# Patient Record
Sex: MALE | Race: WHITE | Employment: FULL TIME | URBAN - METROPOLITAN AREA
[De-identification: names, ages, dates, MRNs, and addresses within clinical notes are randomized per-mention and may not be internally consistent; named-entity substitution may affect disease eponyms.]

---

## 2017-02-02 ENCOUNTER — OFFICE VISIT (OUTPATIENT)
Dept: FAMILY MEDICINE | Facility: CLINIC | Age: 37
End: 2017-02-02
Payer: COMMERCIAL

## 2017-02-02 VITALS
HEART RATE: 71 BPM | OXYGEN SATURATION: 99 % | BODY MASS INDEX: 24.91 KG/M2 | TEMPERATURE: 97 F | SYSTOLIC BLOOD PRESSURE: 131 MMHG | HEIGHT: 70 IN | WEIGHT: 174 LBS | DIASTOLIC BLOOD PRESSURE: 75 MMHG

## 2017-02-02 DIAGNOSIS — M54.2 NECK PAIN: Primary | ICD-10-CM

## 2017-02-02 PROCEDURE — 99213 OFFICE O/P EST LOW 20 MIN: CPT | Performed by: PHYSICIAN ASSISTANT

## 2017-02-02 NOTE — PROGRESS NOTES
"SUBJECTIVE:   Toribio Miller is a 36 year old male seen here today for his right Shoulder   What happened: started bothering him no known injury ,started 3 days ago      Onset: 3 days ago     Description:   Character: Sharp, Stabbing and Gnawing    Intensity: moderate, 6/10    Progression of Symptoms: worse in am ,took 4 tylenol  doesn't feel as bad,doesnt know strength of tylenol    Accompanying Signs & Symptoms:  Other symptoms: weakness of arm cant get out of bed   History:   Previous similar pain: no       Precipitating factors:   Trauma or overuse: no     Alleviating factors:  Improved by: rest/inactivity, immobilization, massage, stretching and acetaminophen       Therapies Tried and outcome: tylenol.   Is RHD, works construction.   Reproducible pain with range of motion on neck and with deep breath. No injury.   Pain with sleeping.     ROS:  Negative for constitutional, eye, ear, nose, throat, skin, respiratory, cardiac, and gastrointestinal other than those outlined in the HPI.    PFSH:  Past Medical, social, family histories, medications, and allergies were reviewed and updated.     OBJECTIVE:  /75 mmHg  Pulse 71  Temp(Src) 97  F (36.1  C) (Oral)  Ht 5' 10\" (1.778 m)  Wt 174 lb (78.926 kg)  BMI 24.97 kg/m2  SpO2 99%  General:  Toribio is awake, alert, and cooperative.  NAD.  Cervical Spine Exam: Inspection: normal cervical lordosis  Tender:  right paracervical muscles, right trapezius muscles, right rhomboid region  Non-tender:  spinous processes  Range of Motion:  Full ROM of cervical spine- pain with flexion and eimli side bending  Strength: 5/5 emili upper extremities   Special tests:  Neg spurlings.  full range of motion emili shoulder. Neurovascularly Intact Distally.     ASSESSMENT:     ICD-10-CM    1. Neck pain M54.2        PLAN:   ice or cold packs 20 minutes every 2-3 hrs as needed to relieve pain and swelling, for the first 2 days. Then can apply heat 20 minutes every 2-3 hrs (avoid sleeping " on heating pad) there after as needed.   If able to tolerate non-steroidal anti-inflammatory medication like: Ibuprofen 600-800 mg three times daily or Aleve 200-400 mg twice daily.   Active range of motion exercises encouraged  Activity modification trying to avoid activities that cause you pain.   Follow up 4 wks as needed   PHYSICAL THERAPY    Alfie Reyez PA-C

## 2017-02-02 NOTE — MR AVS SNAPSHOT
"              After Visit Summary   2/2/2017    Toribio Miller    MRN: [id number]           Patient Information     Date Of Birth          1980        Visit Information        Provider Department      2/2/2017 3:30 PM Alfie Reyez PA-C [facility]        Today's Diagnoses     Neck pain    -  1       Care Instructions    ice or cold packs 20 minutes every 2-3 hrs as needed to relieve pain and swelling, for the first 2 days. Then can apply heat 20 minutes every 2-3 hrs (avoid sleeping on heating pad) there after as needed.   If able to tolerate non-steroidal anti-inflammatory medication like: Ibuprofen 600-800 mg three times daily or Aleve 200-400 mg twice daily.   Active range of motion exercises encouraged  Activity modification trying to avoid activities that cause you pain.   Follow up 4 wks as needed    PHYSICAL THERAPY [phone]       Alfie Reyez PA-C            Follow-ups after your visit        Who to contact     If you have questions or need follow up information about today's clinic visit or your schedule please contact [facility] directly at [phone].  Normal or non-critical lab and imaging results will be communicated to you by [email or web address], letter or phone within 4 business days after the clinic has received the results. If you do not hear from us within 7 days, please contact the clinic through Spotstert or phone. If you have a critical or abnormal lab result, we will notify you by phone as soon as possible.  Submit refill requests through [employer] or call your pharmacy and they will forward the refill request to us. Please allow 3 business days for your refill to be completed.          Additional Information About Your Visit        MyChart Information     [employer] lets you send messages to your doctor, view your test results, renew your prescriptions, schedule appointments and more. To sign up, go to [email or web address] . Click on \"Log in\" on the left side of " "the screen, which will take you to the Welcome page. Then click on \"Sign up Now\" on the right side of the page.     You will be asked to enter the access code listed below, as well as some personal information. Please follow the directions to create your username and password.     Your access code is: [email or web address]  Expires: 5/3/2017  3:51 PM     Your access code will  in 90 days. If you need help or a new code, please call your [place] clinic or [phone].        Care EveryWhere ID     This is your Care EveryWhere ID. This could be used by other organizations to access your [place] medical records  [id number]        Your Vitals Were     Pulse Temperature Height BMI (Body Mass Index) Pulse Oximetry       71 97  F (36.1  C) (Oral) 5' 10\" (1.778 m) 24.97 kg/m2 99%        Blood Pressure from Last 3 Encounters:   17 131/75   01/13/15 149/80    Weight from Last 3 Encounters:   17 174 lb (78.926 kg)   01/13/15 174 lb (78.926 kg)              Today, you had the following     No orders found for display       Primary Care Provider Office Phone # Fax #    [facility] [phone] [address] [address] [facility] [address]        Thank you!     Thank you for choosing [facility]  for your care. Our goal is always to provide you with excellent care. Hearing back from our patients is one way we can continue to improve our services. Please take a few minutes to complete the written survey that you may receive in the mail after your visit with us. Thank you!             Your Updated Medication List - Protect others around you: Learn how to safely use, store and throw away your medicines at www.disposemymeds.org.      Notice  As of 2017  3:51 PM    You have not been prescribed any medications.      "

## 2017-02-02 NOTE — NURSING NOTE
"Chief Complaint   Patient presents with     Shoulder Pain       Initial /75 mmHg  Pulse 71  Temp(Src) 97  F (36.1  C) (Oral)  Ht 5' 10\" (1.778 m)  Wt 174 lb (78.926 kg)  BMI 24.97 kg/m2  SpO2 99% Estimated body mass index is 24.97 kg/(m^2) as calculated from the following:    Height as of this encounter: 5' 10\" (1.778 m).    Weight as of this encounter: 174 lb (78.926 kg).  BP completed using cuff size: hazel Cross CMA   3:29 PM  2/2/2017      "

## 2017-02-02 NOTE — PATIENT INSTRUCTIONS
ice or cold packs 20 minutes every 2-3 hrs as needed to relieve pain and swelling, for the first 2 days. Then can apply heat 20 minutes every 2-3 hrs (avoid sleeping on heating pad) there after as needed.   If able to tolerate non-steroidal anti-inflammatory medication like: Ibuprofen 600-800 mg three times daily or Aleve 200-400 mg twice daily.   Active range of motion exercises encouraged  Activity modification trying to avoid activities that cause you pain.   Follow up 4 wks as needed    PHYSICAL THERAPY [phone]       Alfie Reyez PA-C

## 2017-11-07 ENCOUNTER — OFFICE VISIT (OUTPATIENT)
Dept: FAMILY MEDICINE | Facility: CLINIC | Age: 37
End: 2017-11-07
Payer: COMMERCIAL

## 2017-11-07 ENCOUNTER — RADIANT APPOINTMENT (OUTPATIENT)
Dept: GENERAL RADIOLOGY | Facility: CLINIC | Age: 37
End: 2017-11-07
Attending: FAMILY MEDICINE
Payer: COMMERCIAL

## 2017-11-07 VITALS
HEART RATE: 71 BPM | SYSTOLIC BLOOD PRESSURE: 133 MMHG | OXYGEN SATURATION: 97 % | DIASTOLIC BLOOD PRESSURE: 81 MMHG | TEMPERATURE: 98.1 F | BODY MASS INDEX: 24.68 KG/M2 | WEIGHT: 172 LBS

## 2017-11-07 DIAGNOSIS — R10.9 FLANK PAIN: ICD-10-CM

## 2017-11-07 DIAGNOSIS — R10.9 FLANK PAIN: Primary | ICD-10-CM

## 2017-11-07 PROCEDURE — 72100 X-RAY EXAM L-S SPINE 2/3 VWS: CPT

## 2017-11-07 PROCEDURE — 99214 OFFICE O/P EST MOD 30 MIN: CPT | Performed by: FAMILY MEDICINE

## 2017-11-07 RX ORDER — NAPROXEN 500 MG/1
500 TABLET ORAL 2 TIMES DAILY PRN
Qty: 60 TABLET | Refills: 0 | Status: SHIPPED | OUTPATIENT
Start: 2017-11-07 | End: 2018-10-26

## 2017-11-07 RX ORDER — CYCLOBENZAPRINE HCL 5 MG
5 TABLET ORAL
Qty: 30 TABLET | Refills: 1 | Status: SHIPPED | OUTPATIENT
Start: 2017-11-07 | End: 2018-10-26

## 2017-11-07 NOTE — NURSING NOTE
"Chief Complaint   Patient presents with     Musculoskeletal Problem       Initial /81  Pulse 71  Temp 98.1  F (36.7  C) (Oral)  Wt 172 lb (78 kg)  SpO2 97%  BMI 24.68 kg/m2 Estimated body mass index is 24.68 kg/(m^2) as calculated from the following:    Height as of 2/2/17: 5' 10\" (1.778 m).    Weight as of this encounter: 172 lb (78 kg).  Medication Reconciliation: complete  Roula Browne CMA    "

## 2017-11-07 NOTE — PROGRESS NOTES
SUBJECTIVE:  Toribio Miller, a 37 year old male scheduled an appointment to discuss the following issues:  Back spasms since Sunday/ they come and go.  Seen for back pain 2 years ago and recommended p.t. And naprosyn prescription given.   Was shooting down leg in past.  Now lower back. No radiations in leg/buttock. No weakness. No bm/bladder changes. No p.t./chiro. No family history back issues.   No over the counter meds. Heat helpful.   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   No history kidney stones. No blood in urine or dysuria. No fevers or chills.   No history ulcers.   No past medical history on file.    Past Surgical History:   Procedure Laterality Date     ORTHOPEDIC SURGERY  10yrs ago per patient    left ankle        No family history on file.    Social History   Substance Use Topics     Smoking status: Former Smoker     Smokeless tobacco: Current User     Types: Chew     Alcohol use Yes       ROS:  All other ROS negative    OBJECTIVE:  /81  Pulse 71  Temp 98.1  F (36.7  C) (Oral)  Wt 172 lb (78 kg)  SpO2 97%  BMI 24.68 kg/m2  EXAM:  GENERAL APPEARANCE: healthy, alert and no distress  RESP: lungs clear to auscultation - no rales, rhonchi or wheezes  CV: regular rates and rhythm, normal S1 S2, no S3 or S4 and no murmur, click or rub -  ABDOMEN:  soft, nontender, no HSM or masses and bowel sounds normal  MS: extremities normal- no gross deformities noted, no evidence of inflammation in joints, FROM in all extremities.  MS: tenderness to palp and tight paraspinous right upper lumbar area. Pain with hyperextension  SKIN: no suspicious lesions or rashes  NEURO: Normal strength and tone, sensory exam grossly normal, mentation intact and speech normal  PSYCH: mentation appears normal and affect normal/bright    ASSESSMENT / PLAN:  (R10.9) Flank pain  (primary encounter diagnosis)  Comment: likely strain. Xray ok  Plan: XR Lumbar Spine 2/3 Views, naproxen (NAPROSYN)         500 MG tablet,  cyclobenzaprine (FLEXERIL) 5 MG         tablet, [facility] REFERRAL        Follow-up p.t.. Consider MRI/back speciaslist if worse. Expected course and warning signs reviewed. Reveiwed risks and side effects of medication  Call/email with questions/concerns. Heat/ice/stretching.     Leander Bryant

## 2017-11-07 NOTE — MR AVS SNAPSHOT
After Visit Summary   11/7/2017    Toribio Miller    MRN: [id number]           Patient Information     Date Of Birth          1980        Visit Information        Provider Department      11/7/2017 4:30 PM Leander Bryant MD [facility]        Today's Diagnoses     Flank pain    -  1       Follow-ups after your visit        Additional Services     [facility], AND [facility]RACT[facility] REFERRAL       **This order will print in the [facility] Scheduling Office**    Physical Therapy, Hand Therapy and Chiropractic Care are available through:    *[facility]  *[facility]  *[facility]    Call one number to schedule at any of the above locations: [phone].  Or [facility] [phone]  Your provider has referred you to: Physical Therapy at [facility] or [facility]    Indication/Reason for Referral: Low Back Pain  Onset of Illness: 2 years on/off  Therapy Orders: Evaluate and Treat  Special Programs: None  Special Request: None    Kathy Chavez      Additional Comments for the Therapist or Chiropractor: follow-up back specialist if worse    Please be aware that coverage of these services is subject to the terms and limitations of your health insurance plan.  Call member services at your health plan with any benefit or coverage questions.      Please bring the following to your appointment:    *Your personal calendar for scheduling future appointments  *Comfortable clothing                  Who to contact     If you have questions or need follow up information about today's clinic visit or your schedule please contact [facility] directly at [phone].  Normal or non-critical lab and imaging results will be communicated to you by MyChart, letter or phone within 4 business days after the clinic has received the results. If you do not hear from us within 7 days, please contact the clinic through MyChart or phone. If you have a  "critical or abnormal lab result, we will notify you by phone as soon as possible.  Submit refill requests through [employer] or call your pharmacy and they will forward the refill request to us. Please allow 3 business days for your refill to be completed.          Additional Information About Your Visit        [employer] Information     [employer] lets you send messages to your doctor, view your test results, renew your prescriptions, schedule appointments and more. To sign up, go to [email or web address] . Click on \"Log in\" on the left side of the screen, which will take you to the Welcome page. Then click on \"Sign up Now\" on the right side of the page.     You will be asked to enter the access code listed below, as well as some personal information. Please follow the directions to create your username and password.     Your access code is: [email or web address]  Expires: 2018  5:08 PM     Your access code will  in 90 days. If you need help or a new code, please call your [place] clinic or [phone].        Care EveryWhere ID     This is your Care EveryWhere ID. This could be used by other organizations to access your [place] medical records  [id number]        Your Vitals Were     Pulse Temperature Pulse Oximetry BMI (Body Mass Index)          71 98.1  F (36.7  C) (Oral) 97% 24.68 kg/m2         Blood Pressure from Last 3 Encounters:   17 133/81   17 131/75   01/13/15 149/80    Weight from Last 3 Encounters:   17 172 lb (78 kg)   17 174 lb (78.9 kg)   01/13/15 174 lb (78.9 kg)              We Performed the Following     [facility] REFERRAL          Today's Medication Changes          These changes are accurate as of: 17  5:09 PM.  If you have any questions, ask your nurse or doctor.               Start taking these medicines.        Dose/Directions    cyclobenzaprine 5 MG tablet   Commonly known as:  FLEXERIL   Used for:  Flank pain   Started by:  Leander Bryant, " MD        Dose:  5 mg   Take 1 tablet (5 mg) by mouth nightly as needed for muscle spasms May double dosage if needed   Quantity:  30 tablet   Refills:  1       naproxen 500 MG tablet   Commonly known as:  NAPROSYN   Used for:  Flank pain   Started by:  Leander Bryant MD        Dose:  500 mg   Take 1 tablet (500 mg) by mouth 2 times daily as needed for moderate pain Take with food   Quantity:  60 tablet   Refills:  0            Where to get your medicines      These medications were sent to [facility] [id number] [place] [address], [address]  [address] [address], [facility] [address]     Phone:  [phone]     cyclobenzaprine 5 MG tablet    naproxen 500 MG tablet                Primary Care Provider Office Phone # Fax #    [facility] [phone] [address] [facility] [address]        Equal Access to Services     [facility] CenterJOHN : Hadii denver theodore hadasho Soomaali, waaxda luqadaha, qaybta kaalmada adeegyada, willie bhatt . So [facility] [phone].    ATENCIÓN: Si habla español, tiene a rangel disposición servicios gratuitos de asistencia lingüística. Abrilame al [phone].    We comply with applicable federal civil rights laws and [place] laws. We do not discriminate on the basis of race, color, national origin, age, disability, sex, sexual orientation, or gender identity.            Thank you!     Thank you for choosing [facility]  for your care. Our goal is always to provide you with excellent care. Hearing back from our patients is one way we can continue to improve our services. Please take a few minutes to complete the written survey that you may receive in the mail after your visit with us. Thank you!             Your Updated Medication List - Protect others around you: Learn how to safely use, store and throw away your medicines at www.disposemymeds.org.          This list is accurate as of: 11/7/17  5:09 PM.   Always use your most recent med list.                   Brand Name Dispense Instructions for use Diagnosis    cyclobenzaprine 5 MG tablet    FLEXERIL    30 tablet    Take 1 tablet (5 mg) by mouth nightly as needed for muscle spasms May double dosage if needed    Flank pain       naproxen 500 MG tablet    NAPROSYN    60 tablet    Take 1 tablet (500 mg) by mouth 2 times daily as needed for moderate pain Take with food    Flank pain

## 2018-10-26 ENCOUNTER — OFFICE VISIT (OUTPATIENT)
Dept: PEDIATRICS | Facility: CLINIC | Age: 38
End: 2018-10-26
Payer: COMMERCIAL

## 2018-10-26 VITALS
DIASTOLIC BLOOD PRESSURE: 72 MMHG | TEMPERATURE: 100.2 F | HEART RATE: 94 BPM | SYSTOLIC BLOOD PRESSURE: 148 MMHG | WEIGHT: 172 LBS | HEIGHT: 71 IN | BODY MASS INDEX: 24.08 KG/M2 | OXYGEN SATURATION: 98 %

## 2018-10-26 DIAGNOSIS — Z23 NEED FOR PROPHYLACTIC VACCINATION AND INOCULATION AGAINST INFLUENZA: ICD-10-CM

## 2018-10-26 DIAGNOSIS — R79.89 ABNORMAL LFTS: ICD-10-CM

## 2018-10-26 DIAGNOSIS — R50.9 LOW GRADE FEVER: ICD-10-CM

## 2018-10-26 DIAGNOSIS — R51.9 ACUTE NONINTRACTABLE HEADACHE, UNSPECIFIED HEADACHE TYPE: Primary | ICD-10-CM

## 2018-10-26 DIAGNOSIS — R10.9 FLANK PAIN: ICD-10-CM

## 2018-10-26 LAB
ALBUMIN SERPL-MCNC: 3.7 G/DL (ref 3.4–5)
ALBUMIN UR-MCNC: 10 MG/DL
ALP SERPL-CCNC: 86 U/L (ref 40–150)
ALT SERPL W P-5'-P-CCNC: 159 U/L (ref 0–70)
ANION GAP SERPL CALCULATED.3IONS-SCNC: 4 MMOL/L (ref 3–14)
APPEARANCE UR: CLEAR
AST SERPL W P-5'-P-CCNC: 105 U/L (ref 0–45)
BILIRUB SERPL-MCNC: 0.5 MG/DL (ref 0.2–1.3)
BILIRUB UR QL STRIP: NEGATIVE
BUN SERPL-MCNC: 15 MG/DL (ref 7–30)
CALCIUM SERPL-MCNC: 8.6 MG/DL (ref 8.5–10.1)
CHLORIDE SERPL-SCNC: 103 MMOL/L (ref 94–109)
CO2 SERPL-SCNC: 30 MMOL/L (ref 20–32)
COLOR UR AUTO: YELLOW
CREAT SERPL-MCNC: 0.76 MG/DL (ref 0.66–1.25)
CRP SERPL-MCNC: 33.5 MG/L (ref 0–8)
DIFFERENTIAL METHOD BLD: ABNORMAL
ERYTHROCYTE [DISTWIDTH] IN BLOOD BY AUTOMATED COUNT: 13.2 % (ref 10–15)
ERYTHROCYTE [SEDIMENTATION RATE] IN BLOOD BY WESTERGREN METHOD: 17 MM/H (ref 0–15)
GFR SERPL CREATININE-BSD FRML MDRD: >90 ML/MIN/1.7M2
GLUCOSE SERPL-MCNC: 110 MG/DL (ref 70–99)
GLUCOSE UR STRIP-MCNC: NEGATIVE MG/DL
HCT VFR BLD AUTO: 39.2 % (ref 40–53)
HETEROPH AB SER QL: NEGATIVE
HGB BLD-MCNC: 13.3 G/DL (ref 13.3–17.7)
HGB UR QL STRIP: ABNORMAL
KETONES UR STRIP-MCNC: NEGATIVE MG/DL
LEUKOCYTE ESTERASE UR QL STRIP: NEGATIVE
LYMPHOCYTES # BLD AUTO: 4.6 10E9/L (ref 0.8–5.3)
LYMPHOCYTES NFR BLD AUTO: 65 %
MCH RBC QN AUTO: 29.7 PG (ref 26.5–33)
MCHC RBC AUTO-ENTMCNC: 33.9 G/DL (ref 31.5–36.5)
MCV RBC AUTO: 88 FL (ref 78–100)
MONOCYTES # BLD AUTO: 0.4 10E9/L (ref 0–1.3)
MONOCYTES NFR BLD AUTO: 5 %
MUCOUS THREADS #/AREA URNS LPF: PRESENT /LPF
NEUTROPHILS # BLD AUTO: 2.2 10E9/L (ref 1.6–8.3)
NEUTROPHILS NFR BLD AUTO: 30 %
NITRATE UR QL: NEGATIVE
NON-SQ EPI CELLS #/AREA URNS LPF: ABNORMAL /LPF
PH UR STRIP: 5.5 PH (ref 5–7)
PLATELET # BLD AUTO: 275 10E9/L (ref 150–450)
PLATELET # BLD EST: ABNORMAL 10*3/UL
POTASSIUM SERPL-SCNC: 3.7 MMOL/L (ref 3.4–5.3)
PROT SERPL-MCNC: 7.9 G/DL (ref 6.8–8.8)
RBC # BLD AUTO: 4.48 10E12/L (ref 4.4–5.9)
RBC #/AREA URNS AUTO: ABNORMAL /HPF
RBC MORPH BLD: NORMAL
SODIUM SERPL-SCNC: 137 MMOL/L (ref 133–144)
SOURCE: ABNORMAL
SP GR UR STRIP: 1.02 (ref 1–1.03)
UROBILINOGEN UR STRIP-MCNC: 2 MG/DL (ref 0–2)
VARIANT LYMPHS BLD QL SMEAR: PRESENT
WBC # BLD AUTO: 7.2 10E9/L (ref 4–11)
WBC #/AREA URNS AUTO: ABNORMAL /HPF

## 2018-10-26 PROCEDURE — 99214 OFFICE O/P EST MOD 30 MIN: CPT | Performed by: INTERNAL MEDICINE

## 2018-10-26 PROCEDURE — 86140 C-REACTIVE PROTEIN: CPT | Performed by: INTERNAL MEDICINE

## 2018-10-26 PROCEDURE — 80053 COMPREHEN METABOLIC PANEL: CPT | Performed by: INTERNAL MEDICINE

## 2018-10-26 PROCEDURE — 85652 RBC SED RATE AUTOMATED: CPT | Performed by: INTERNAL MEDICINE

## 2018-10-26 PROCEDURE — 99000 SPECIMEN HANDLING OFFICE-LAB: CPT | Performed by: INTERNAL MEDICINE

## 2018-10-26 PROCEDURE — 81001 URINALYSIS AUTO W/SCOPE: CPT | Performed by: INTERNAL MEDICINE

## 2018-10-26 PROCEDURE — 86666 EHRLICHIA ANTIBODY: CPT | Mod: 90 | Performed by: INTERNAL MEDICINE

## 2018-10-26 PROCEDURE — 36415 COLL VENOUS BLD VENIPUNCTURE: CPT | Performed by: INTERNAL MEDICINE

## 2018-10-26 PROCEDURE — 86308 HETEROPHILE ANTIBODY SCREEN: CPT | Performed by: INTERNAL MEDICINE

## 2018-10-26 PROCEDURE — 85025 COMPLETE CBC W/AUTO DIFF WBC: CPT | Performed by: INTERNAL MEDICINE

## 2018-10-26 PROCEDURE — 86618 LYME DISEASE ANTIBODY: CPT | Performed by: INTERNAL MEDICINE

## 2018-10-26 RX ORDER — NAPROXEN 500 MG/1
500 TABLET ORAL 2 TIMES DAILY PRN
Qty: 30 TABLET | Refills: 0 | Status: SHIPPED | OUTPATIENT
Start: 2018-10-26 | End: 2018-10-30

## 2018-10-26 NOTE — PROGRESS NOTES
SUBJECTIVE:   Toribio Miller is a 38 year old male who presents to clinic today for the following health issues:    Concern - Low back/sciatic pain/woke up in the middle of the night to go to the bathroom and couldn't walk.  Onset: 3 weeks    Description:   Right sided lower back/sciatica pain, headaches everyday, pain in this spinal cord, feels feverish.  One night his temp was 100.2  Fevers only at night but he is taking tylenol and Advil all day long.  Hasn't missed any work but all day long he is miserable.  Legs are killing him    Intensity: severe    Progression of Symptoms:  worsening    Accompanying Signs & Symptoms:  Bilateral leg pain.  Right is worse than left.    Previous history of similar problem:   History of his sciatica acting up before but not to this extent.    Precipitating factors:   Worsened by: Movement and inactivity    Alleviating factors:  Improved by: Lying in the fetal position.      Therapies Tried and outcome: Advil, tylenol    HPI  38-year-old patient comes in stating that for 3 weeks he has not been feeling well.  He woke up about 3 weeks ago in the middle of night and was severely nauseous but did not vomit.  The following day he noticed generalized aching and a low-grade temperature.  he checked his temp only once and it was around 100.2.  The fever with chills seems to come more in the evenings.  He is still working full-time as a garcia.  Last 3 weeks he has been having just generalized aching headache starting in his neck.  The headache started just couple of days after he felt ill.  Last 3 days he has had some neck stiffness or discomfort as well.  He has been taking Tylenol and Advil on a as needed basis and that has helped.  Is also been having pain that starts in his right low back and radiates to the right leg.  It feels like a sciatic pain that he has had in the past.  In the past he has received muscle relaxer and Naprosyn which seemed to help the problem within few  "days.  He denies weakness of the lower extremity or tingling numbness.    The patient indicates that he has not had any outside travel.  He has not been out in the woods.  Does not have any pets at home.  Has not noticed any rash.  No lateralizing weakness or visual symptoms.  No head congestion or postnasal drip.  No cough or shortness of breath.  No abdominal pain nausea vomiting or diarrhea.  No dysuria or urinary frequency.  Has not noticed joint swelling or joint redness though he has noticed aching.    Regarding his back issue he has had a plain x-ray of the back on 11/7/2017 which was normal.    Problem list and histories reviewed & adjusted, as indicated.  Additional history: as documented    Current Outpatient Prescriptions   Medication Sig Dispense Refill     naproxen (NAPROSYN) 500 MG tablet Take 1 tablet (500 mg) by mouth 2 times daily as needed for moderate pain Take with food 30 tablet 0     No Known Allergies    Reviewed and updated as needed this visit by clinical staff  Tobacco  Allergies  Meds  Med Hx  Surg Hx  Fam Hx  Soc Hx      Reviewed and updated as needed this visit by Provider         ROS:  Constitutional, HEENT, cardiovascular, pulmonary, GI, , musculoskeletal, neuro, skin, endocrine and psych systems are negative, except as otherwise noted.    OBJECTIVE:     /72 (BP Location: Right arm, Patient Position: Sitting, Cuff Size: Adult Regular)  Pulse 94  Temp 100.2  F (37.9  C) (Temporal)  Ht 5' 10.5\" (1.791 m)  Wt 172 lb (78 kg)  SpO2 98%  BMI 24.33 kg/m2  Body mass index is 24.33 kg/(m^2).  GENERAL: healthy, alert and no distress  EYES: Eyes grossly normal to inspection, PERRL and conjunctivae and sclerae normal  HENT: ear canals and TM's normal, nose and mouth without ulcers or lesions  NECK: no adenopathy, no asymmetry, masses, or scars and thyroid normal to palpation  RESP: lungs clear to auscultation - no rales, rhonchi or wheezes  CV: regular rate and rhythm, normal " S1 S2, no S3 or S4, no murmur, click or rub, no peripheral edema and peripheral pulses strong  ABDOMEN: soft, nontender, no hepatosplenomegaly, no masses and bowel sounds normal  MS: no gross musculoskeletal defects noted, no edema  SKIN: no suspicious lesions or rashes  NEURO: Normal strength and tone, mentation intact and speech normal  BACK: no CVA tenderness, no paralumbar tenderness  PSYCH: mentation appears normal, affect normal/bright  LYMPH: no cervical, supraclavicular, axillary, or inguinal adenopathy    Diagnostic Test Results:  Results for orders placed or performed in visit on 10/26/18 (from the past 24 hour(s))   CBC with platelets differential   Result Value Ref Range    WBC 7.2 4.0 - 11.0 10e9/L    RBC Count 4.48 4.4 - 5.9 10e12/L    Hemoglobin 13.3 13.3 - 17.7 g/dL    Hematocrit 39.2 (L) 40.0 - 53.0 %    MCV 88 78 - 100 fl    MCH 29.7 26.5 - 33.0 pg    MCHC 33.9 31.5 - 36.5 g/dL    RDW 13.2 10.0 - 15.0 %    Platelet Count 275 150 - 450 10e9/L    % Neutrophils 30.0 %    % Lymphocytes 65.0 %    % Monocytes 5.0 %    Absolute Neutrophil 2.2 1.6 - 8.3 10e9/L    Absolute Lymphocytes 4.6 0.8 - 5.3 10e9/L    Absolute Monocytes 0.4 0.0 - 1.3 10e9/L    Reactive Lymphs Present     RBC Morphology Normal     Platelet Estimate       Automated count confirmed.  Platelet morphology is normal.    Diff Method Manual Differential    Comprehensive metabolic panel   Result Value Ref Range    Sodium 137 133 - 144 mmol/L    Potassium 3.7 3.4 - 5.3 mmol/L    Chloride 103 94 - 109 mmol/L    Carbon Dioxide 30 20 - 32 mmol/L    Anion Gap 4 3 - 14 mmol/L    Glucose 110 (H) 70 - 99 mg/dL    Urea Nitrogen 15 7 - 30 mg/dL    Creatinine 0.76 0.66 - 1.25 mg/dL    GFR Estimate >90 >60 mL/min/1.7m2    GFR Estimate If Black >90 >60 mL/min/1.7m2    Calcium 8.6 8.5 - 10.1 mg/dL    Bilirubin Total 0.5 0.2 - 1.3 mg/dL    Albumin 3.7 3.4 - 5.0 g/dL    Protein Total 7.9 6.8 - 8.8 g/dL    Alkaline Phosphatase 86 40 - 150 U/L     (H) 0  - 70 U/L     (H) 0 - 45 U/L   Erythrocyte sedimentation rate auto   Result Value Ref Range    Sed Rate 17 (H) 0 - 15 mm/h   CRP inflammation   Result Value Ref Range    CRP Inflammation 33.5 (H) 0.0 - 8.0 mg/L   UA with Microscopic reflex to Culture ([place]; [facility])   Result Value Ref Range    Color Urine Yellow     Appearance Urine Clear     Glucose Urine Negative NEG^Negative mg/dL    Bilirubin Urine Negative NEG^Negative    Ketones Urine Negative NEG^Negative mg/dL    Specific Gravity Urine 1.023 1.003 - 1.035    Blood Urine Moderate (A) NEG^Negative    pH Urine 5.5 5.0 - 7.0 pH    Protein Albumin Urine 10 (A) NEG^Negative mg/dL    Urobilinogen mg/dL 2.0 0.0 - 2.0 mg/dL    Nitrite Urine Negative NEG^Negative    Leukocyte Esterase Urine Negative NEG^Negative    Source Midstream Urine     WBC Urine 0 - 5 OTO5^0 - 5 /HPF    RBC Urine 2-5 (A) OTO2^O - 2 /HPF    Squamous Epithelial /LPF Urine Few FEW^Few /LPF    Mucous Urine Present (A) NEG^Negative /LPF       ASSESSMENT/PLAN:     1.  Low-grade fever for the past 3 weeks with  elevated transaminases and shift to the left with increased lymphocytes.  Viral etiology to be considered.  Hepatitis B or hepatitis C unlikely because of his history.  No history of drug use or change in sex partners.  EB viral infection is possible and I will check mono test.  I will also check for Lyme's disease and anaplasmosis.  At this point I do not recommend ultrasound of the abdomen but before considering further investigation will probably have him come back for reassessment.  His symptoms do not fit picture of autoimmune hepatitis either at this point.  But those are some other things to be considered in the future.  2.  Right leg radicular pain.  I think this may be a separate issue.  It is a recurrent issue for him.  In the past Naprosyn has worked.  So I prescribed Naprosyn 5 mg twice a day for now with full understanding that that that medication can also  potentially affect liver function studies.    Weston Coelho MD  [facility]

## 2018-10-26 NOTE — MR AVS SNAPSHOT
After Visit Summary   10/26/2018    Toribio Miller    MRN: [id number]           Patient Information     Date Of Birth          1980        Visit Information        Provider Department      10/26/2018 2:30 PM Weston Coelho MD [facility]        Today's Diagnoses     Acute nonintractable headache, unspecified headache type    -  1    Need for prophylactic vaccination and inoculation against influenza        Low grade fever        Abnormal LFTs        Flank pain           Follow-ups after your visit        Follow-up notes from your care team     Return if symptoms worsen or fail to improve.      Who to contact     If you have questions or need follow up information about today's clinic visit or your schedule please contact [facility] directly at [phone].  Normal or non-critical lab and imaging results will be communicated to you by MyChart, letter or phone within 4 business days after the clinic has received the results. If you do not hear from us within 7 days, please contact the clinic through [employer] or phone. If you have a critical or abnormal lab result, we will notify you by phone as soon as possible.  Submit refill requests through [employer] or call your pharmacy and they will forward the refill request to us. Please allow 3 business days for your refill to be completed.          Additional Information About Your Visit        MyChart Information     [employer] is an electronic gateway that provides easy, online access to your medical records. With [employer], you can request a clinic appointment, read your test results, renew a prescription or communicate with your care team.     To sign up for [employer] visit the website at [email or web address]   You will be asked to enter the access code listed below, as well as some personal information. Please follow the directions to create your username and password.     Your access code is: [email or web address]  Expires:  "2019  4:02 PM     Your access code will  in 90 days. If you need help or a new code, please contact your [facility] or call [phone] for assistance.        Care EveryWhere ID     This is your Care EveryWhere ID. This could be used by other organizations to access your [facility] medical records  [id number]        Your Vitals Were     Pulse Temperature Height Pulse Oximetry BMI (Body Mass Index)       94 100.2  F (37.9  C) (Temporal) 5' 10.5\" (1.791 m) 98% 24.33 kg/m2        Blood Pressure from Last 3 Encounters:   10/26/18 148/72   17 133/81   17 131/75    Weight from Last 3 Encounters:   10/26/18 172 lb (78 kg)   17 172 lb (78 kg)   17 174 lb (78.9 kg)              We Performed the Following     **Lyme Disease Shelly with reflex to WB Serum FUTURE 14d     Anaplasma phagocytoph antibody IgG IgM     CBC with platelets differential     Comprehensive metabolic panel     CRP inflammation     Erythrocyte sedimentation rate auto     FLU VACCINE, SPLIT VIRUS, IM (QUADRIVALENT) [32692]- >3 YRS     Mononucleosis screen     UA with Microscopic reflex to Culture ([place]; [facility])     Vaccine Administration, Initial [82494]          Where to get your medicines      These medications were sent to [facility] - [place], [place] - [address], [address]  [address], [address], [facility] [address]     Phone:  [phone]     naproxen 500 MG tablet          Primary Care Provider Office Phone # Fax #    [facility] [phone] [address] [facility] [address]        Equal Access to Services     HAMZAH TRAMMELL AH: Hadii denver Fu, waaxda luqadaha, qaybta kaalmada adeleighda, willie christianson. So [facility] [phone].    ATENCIÓN: Si habla español, tiene a rangel disposición servicios gratuitos de asistencia lingüística. Llame al [phone].    We comply " with applicable federal civil rights laws and [place] laws. We do not discriminate on the basis of race, color, national origin, age, disability, sex, sexual orientation, or gender identity.            Thank you!     Thank you for choosing [facility]  for your care. Our goal is always to provide you with excellent care. Hearing back from our patients is one way we can continue to improve our services. Please take a few minutes to complete the written survey that you may receive in the mail after your visit with us. Thank you!             Your Updated Medication List - Protect others around you: Learn how to safely use, store and throw away your medicines at www.disposemymeds.org.          This list is accurate as of 10/26/18  4:02 PM.  Always use your most recent med list.                   Brand Name Dispense Instructions for use Diagnosis    naproxen 500 MG tablet    NAPROSYN    30 tablet    Take 1 tablet (500 mg) by mouth 2 times daily as needed for moderate pain Take with food    Flank pain

## 2018-10-29 ENCOUNTER — TELEPHONE (OUTPATIENT)
Dept: PEDIATRICS | Facility: CLINIC | Age: 38
End: 2018-10-29

## 2018-10-29 LAB
A PHAGOCYTOPH IGG TITR SER IF: NORMAL {TITER}
A PHAGOCYTOPH IGM TITR SER IF: NORMAL {TITER}
B BURGDOR IGG+IGM SER QL: 0.26 (ref 0–0.89)

## 2018-10-29 NOTE — TELEPHONE ENCOUNTER
the patient informed him that the Lyme's titer test was negative and so was the mono test.  Anaplasma still pending.  He was wondering if excessive amount of Advil and Tylenol that he has been taking for the past 3 weeks may have affected his liver enzyme.  It certainly could.  I told him not to use any nonsteroidal anti-inflammatory medication for now.  Restrict the use of extra strength Tylenol 500 mg to less than 6 tablets a day.  I will get back to him once I have the Anaplasma level.    Over the weekend he felt little better though still has aching in the back and now the left leg and still right leg hurts.  He has not had fever though.  Patient informed that we may have to investigate further and repeat some of the test like there is a CBC with differential count and liver function studies since they were abnormal.  After that additional diagnostic testing may be necessary such as CT scan of the chest abdomen pelvis.

## 2018-10-29 NOTE — TELEPHONE ENCOUNTER
[facility] Call Center    Phone Message    May a detailed message be left on voicemail: yes    Reason for Call: Requesting Results   Name/type of test: lab results  Date of test: 10/26/18 dr. smith  Was test done at a location other than [facility] (Please fill in the location if not [facility])?: No      Action Taken: Message routed to:  Primary Care p [phone]

## 2018-10-29 NOTE — TELEPHONE ENCOUNTER
Routing message to Dr. Coelho for review of lab results dated 10/26/18. At this time Anaplasma phagocytoph antibody IgG IgM is still in process.  Lucy De Luna, CMA

## 2018-10-30 ENCOUNTER — OFFICE VISIT (OUTPATIENT)
Dept: PEDIATRICS | Facility: CLINIC | Age: 38
End: 2018-10-30
Payer: COMMERCIAL

## 2018-10-30 ENCOUNTER — RADIANT APPOINTMENT (OUTPATIENT)
Dept: GENERAL RADIOLOGY | Facility: CLINIC | Age: 38
End: 2018-10-30
Attending: INTERNAL MEDICINE
Payer: COMMERCIAL

## 2018-10-30 VITALS
TEMPERATURE: 99.7 F | DIASTOLIC BLOOD PRESSURE: 82 MMHG | WEIGHT: 171.2 LBS | BODY MASS INDEX: 23.97 KG/M2 | HEART RATE: 91 BPM | OXYGEN SATURATION: 100 % | SYSTOLIC BLOOD PRESSURE: 121 MMHG | HEIGHT: 71 IN

## 2018-10-30 DIAGNOSIS — M79.10 MYALGIA: ICD-10-CM

## 2018-10-30 DIAGNOSIS — R07.9 LEFT SIDED CHEST PAIN: ICD-10-CM

## 2018-10-30 DIAGNOSIS — R50.9 FEVER OF UNKNOWN ORIGIN: ICD-10-CM

## 2018-10-30 DIAGNOSIS — R50.9 FEVER, UNSPECIFIED: Primary | ICD-10-CM

## 2018-10-30 DIAGNOSIS — R79.89 ABNORMAL LFTS: ICD-10-CM

## 2018-10-30 DIAGNOSIS — R50.9 FEVER OF UNKNOWN ORIGIN: Primary | ICD-10-CM

## 2018-10-30 LAB
ALBUMIN SERPL-MCNC: 3.5 G/DL (ref 3.4–5)
ALP SERPL-CCNC: 93 U/L (ref 40–150)
ALT SERPL W P-5'-P-CCNC: 248 U/L (ref 0–70)
AST SERPL W P-5'-P-CCNC: 178 U/L (ref 0–45)
BASOPHILS # BLD AUTO: 0.1 10E9/L (ref 0–0.2)
BASOPHILS NFR BLD AUTO: 1 %
BILIRUB DIRECT SERPL-MCNC: 0.1 MG/DL (ref 0–0.2)
BILIRUB SERPL-MCNC: 0.5 MG/DL (ref 0.2–1.3)
CK SERPL-CCNC: 77 U/L (ref 30–300)
CRP SERPL-MCNC: 47 MG/L (ref 0–8)
DIFFERENTIAL METHOD BLD: ABNORMAL
ERYTHROCYTE [DISTWIDTH] IN BLOOD BY AUTOMATED COUNT: 13.3 % (ref 10–15)
ERYTHROCYTE [SEDIMENTATION RATE] IN BLOOD BY WESTERGREN METHOD: 21 MM/H (ref 0–15)
HCT VFR BLD AUTO: 38.8 % (ref 40–53)
HGB BLD-MCNC: 12.8 G/DL (ref 13.3–17.7)
LYMPHOCYTES # BLD AUTO: 5.4 10E9/L (ref 0.8–5.3)
LYMPHOCYTES NFR BLD AUTO: 65 %
MCH RBC QN AUTO: 29.1 PG (ref 26.5–33)
MCHC RBC AUTO-ENTMCNC: 33 G/DL (ref 31.5–36.5)
MCV RBC AUTO: 88 FL (ref 78–100)
METAMYELOCYTES # BLD: 0.1 10E9/L
METAMYELOCYTES NFR BLD MANUAL: 1 %
MONOCYTES # BLD AUTO: 0.5 10E9/L (ref 0–1.3)
MONOCYTES NFR BLD AUTO: 6 %
NEUTROPHILS # BLD AUTO: 2.3 10E9/L (ref 1.6–8.3)
NEUTROPHILS NFR BLD AUTO: 27 %
PLATELET # BLD AUTO: 316 10E9/L (ref 150–450)
PLATELET # BLD EST: ABNORMAL 10*3/UL
PROT SERPL-MCNC: 7.7 G/DL (ref 6.8–8.8)
RBC # BLD AUTO: 4.4 10E12/L (ref 4.4–5.9)
RBC MORPH BLD: ABNORMAL
SMUDGE CELLS BLD QL SMEAR: PRESENT
VARIANT LYMPHS BLD QL SMEAR: PRESENT
WBC # BLD AUTO: 8.4 10E9/L (ref 4–11)

## 2018-10-30 PROCEDURE — 87340 HEPATITIS B SURFACE AG IA: CPT | Performed by: INTERNAL MEDICINE

## 2018-10-30 PROCEDURE — 86140 C-REACTIVE PROTEIN: CPT | Performed by: INTERNAL MEDICINE

## 2018-10-30 PROCEDURE — 86803 HEPATITIS C AB TEST: CPT | Performed by: INTERNAL MEDICINE

## 2018-10-30 PROCEDURE — 99000 SPECIMEN HANDLING OFFICE-LAB: CPT | Performed by: INTERNAL MEDICINE

## 2018-10-30 PROCEDURE — 85652 RBC SED RATE AUTOMATED: CPT | Performed by: INTERNAL MEDICINE

## 2018-10-30 PROCEDURE — 85025 COMPLETE CBC W/AUTO DIFF WBC: CPT | Performed by: INTERNAL MEDICINE

## 2018-10-30 PROCEDURE — 86709 HEPATITIS A IGM ANTIBODY: CPT | Performed by: INTERNAL MEDICINE

## 2018-10-30 PROCEDURE — 82550 ASSAY OF CK (CPK): CPT | Performed by: INTERNAL MEDICINE

## 2018-10-30 PROCEDURE — 36415 COLL VENOUS BLD VENIPUNCTURE: CPT | Performed by: INTERNAL MEDICINE

## 2018-10-30 PROCEDURE — 99215 OFFICE O/P EST HI 40 MIN: CPT | Performed by: INTERNAL MEDICINE

## 2018-10-30 PROCEDURE — 86376 MICROSOMAL ANTIBODY EACH: CPT | Mod: 90 | Performed by: INTERNAL MEDICINE

## 2018-10-30 PROCEDURE — 86038 ANTINUCLEAR ANTIBODIES: CPT | Performed by: INTERNAL MEDICINE

## 2018-10-30 PROCEDURE — 71046 X-RAY EXAM CHEST 2 VIEWS: CPT | Performed by: RADIOLOGY

## 2018-10-30 PROCEDURE — 86039 ANTINUCLEAR ANTIBODIES (ANA): CPT | Performed by: INTERNAL MEDICINE

## 2018-10-30 PROCEDURE — 80076 HEPATIC FUNCTION PANEL: CPT | Performed by: INTERNAL MEDICINE

## 2018-10-30 PROCEDURE — 83516 IMMUNOASSAY NONANTIBODY: CPT | Performed by: INTERNAL MEDICINE

## 2018-10-30 NOTE — MR AVS SNAPSHOT
After Visit Summary   10/30/2018    Toribio Miller    MRN: [id number]           Patient Information     Date Of Birth          1980        Visit Information        Provider Department      10/30/2018 3:10 PM Weston Coelho MD [facility]        Today's Diagnoses     Fever of unknown origin    -  1    Abnormal LFTs        Myalgia        Left sided chest pain           Follow-ups after your visit        Your next 10 appointments already scheduled     Oct 30, 2018  5:25 PM CDT   XR CHEST 2 VIEWS with MGXR2   [facility])    [address] [place] [id number] [phone]           How do I prepare for my exam? (Food and drink instructions) No Food and Drink Restrictions.  How do I prepare for my exam? (Other instructions) You do not need to do anything special for this exam.  What should I wear: Wear comfortable clothes.  How long does the exam take: Most scans take less than 5 minutes.  What should I bring: Bring a list of your medicines, including vitamins, minerals and over-the-counter drugs. It is safest to leave personal items at home.  Do I need a :  No  is needed.  What do I need to tell my doctor: Tell your doctor if there s any chance you are pregnant.  What should I do after the exam: No restrictions, You may resume normal activities.  What is this test: An image of a specific body part shown in shades of black and white.  Who should I call with questions: If you have any questions, please call the Imaging Department where you will have your exam. Directions, parking instructions, and other information is available on our website, [email or web address].            Oct 31, 2018  8:00 AM CDT   (Arrive by 7:30 AM)   CT ABDOMEN PELVIS W CONTRAST with MGCT1   [facility] ([facility])    [address] [address] [place] [id number] [phone]           How do I prepare  for my exam? (Food and drink instructions) To prepare: Do not eat or drink for 2 hours before your exam. If you need to take medicine, you may take it with small sips of water. (We may ask you to take liquid medicine as well.)  How do I prepare for my exam? (Other instructions) Please arrive 30 minutes early for your CT.  Once in the department you might be asked to drink water 15-20 minutes prior to your exam.  If indicated you may be asked to drink an oral contrast in advance of your CT.  If this is the case, the imaging team will let you know or be in contact with you prior to your appointment  Patients over 70 or patients with diabetes or kidney problems: If you haven t had a blood test (creatinine test) within the last 30 days, the Cardiologist/Radiologist may require you to get this test prior to your exam.  If you have diabetes:  Continue to take your metformin medication on the day of your exam  What should I wear: Please wear loose clothing, such as a sweat suit or jogging clothes. Avoid snaps, zippers and other metal. We may ask you to undress and put on a hospital gown.  How long does the exam take: Most scans take less than 20 minutes.  What should I bring: Please bring any scans or X-rays taken at other hospitals, if similar tests were done. Also bring a list of your medicines, including vitamins, minerals and over-the-counter drugs. It is safest to leave personal items at home.  Do I need a : No  is needed.  What do I need to tell my doctor? Be sure to tell your doctor: * If you have any allergies. * If there s any chance you are pregnant. * If you are breastfeeding.  What should I do after the exam: No restrictions, You may resume normal activities.  What is this test: A CT (computed tomography) scan is a series of pictures that allows us to look inside your body. The scanner creates images of the body in cross sections, much like slices of bread. This helps us see any problems more  clearly. You may receive contrast (X-ray dye) before or during your scan. You will be asked to drink the contrast.  Who should I call with questions: If you have any questions, please call the Imaging Department where you will have your exam. Directions, parking instructions, and other information is available on our website, [email or web address].            Oct 31, 2018  8:30 AM CDT   CT CHEST W/O CONTRAST with MGCT1   [facility] ([facility])    [address] [place] [id number] [phone]           How do I prepare for my exam? (Food and drink instructions) No Food and Drink Restrictions.  How do I prepare for my exam? (Other instructions) You do not need to do anything special to prepare for this exam. For a sinus scan: Use your nose spray (nasal decongestant spray) as directed.  What should I wear: Please wear loose clothing, such as a sweat suit or jogging clothes. Avoid snaps, zippers and other metal. We may ask you to undress and put on a hospital gown.  How long does the exam take: Most scans take less than 20 minutes.  What should I bring: Please bring any scans or X-rays taken at other hospitals, if similar tests were done. Also bring a list of your medicines, including vitamins, minerals and over-the-counter drugs. It is safest to leave personal items at home.  Do I need a : No  is needed.  What do I need to tell my doctor? Be sure to tell your doctor: * If you have any allergies. * If there s any chance you are pregnant. * If you are breastfeeding.  What should I do after the exam: No restrictions, You may resume normal activities.  What is this test: A CT (computed tomography) scan is a series of pictures that allows us to look inside your body. The scanner creates images of the body in cross sections, much like slices of bread. This helps us see any problems more clearly.  Who should I call with questions: If you have any questions,  please call the Imaging Department where you will have your exam. Directions, parking instructions, and other information is available on our website, [email or web address].              Future tests that were ordered for you today     Open Future Orders        Priority Expected Expires Ordered    CT Abdomen Pelvis w Contrast Routine  10/30/2019 10/30/2018    CT Chest w/o Contrast Routine  10/30/2019 10/30/2018            Who to contact     If you have questions or need follow up information about today's clinic visit or your schedule please contact [facility] directly at [phone].  Normal or non-critical lab and imaging results will be communicated to you by QU[employer]IA (formerly known as LocalResponse)hart, letter or phone within 4 business days after the clinic has received the results. If you do not hear from us within 7 days, please contact the clinic through [employer] (formerly known as Local[employer])hart or phone. If you have a critical or abnormal lab result, we will notify you by phone as soon as possible.  Submit refill requests through [employer] or call your pharmacy and they will forward the refill request to us. Please allow 3 business days for your refill to be completed.          Additional Information About Your Visit        [employer] Information     [employer] is an electronic gateway that provides easy, online access to your medical records. With [employer], you can request a clinic appointment, read your test results, renew a prescription or communicate with your care team.     To sign up for [employer] visit the website at [email or web address]   You will be asked to enter the access code listed below, as well as some personal information. Please follow the directions to create your username and password.     Your access code is: [email or web address]  Expires: 2019  4:02 PM     Your access code will  in 90 days. If you need help or a new code, please contact your [facility] or call [phone] for assistance.        Care  "EveryWhere ID     This is your Care EveryWhere ID. This could be used by other organizations to access your [facility] medical records  [id number]        Your Vitals Were     Pulse Temperature Height Pulse Oximetry BMI (Body Mass Index)       91 99.7  F (37.6  C) (Temporal) 5' 10.5\" (1.791 m) 100% 24.22 kg/m2        Blood Pressure from Last 3 Encounters:   10/30/18 121/82   10/26/18 148/72   11/07/17 133/81    Weight from Last 3 Encounters:   10/30/18 171 lb 3.2 oz (77.7 kg)   10/26/18 172 lb (78 kg)   11/07/17 172 lb (78 kg)              We Performed the Following     Anti Nuclear Shelly IgG by IFA with Reflex     CBC with platelets differential     CK total     CRP inflammation     Erythrocyte sedimentation rate auto     Hepatic panel     Hepatitis A antibody IgM     Hepatitis B surface antigen     Hepatitis C antibody     Liver kidney microsomal antibody IgG     Mitochondrial M2 Antibody IgG        Primary Care Provider Office Phone # Fax #    [facility] [phone] [address] [facility] [address]        Equal Access to Services     HAMZAH TRAMMELL : Hadii aad ku hadasho Soomaali, waaxda luqadaha, qaybta kaalmada adeegyagabe, willie christianson. So [facility] [phone].    ATENCIÓN: Si habla español, tiene a rangel disposición servicios gratuitos de asistencia lingüística. [facility] [phone].    We comply with applicable federal civil rights laws and [place] laws. We do not discriminate on the basis of race, color, national origin, age, disability, sex, sexual orientation, or gender identity.            Thank you!     Thank you for choosing [facility]  for your care. Our goal is always to provide you with excellent care. Hearing back from our patients is one way we can continue to improve our services. Please take a few minutes to complete the written survey that you may receive in the mail after your visit with us. Thank you!             Your " Updated Medication List - Protect others around you: Learn how to safely use, store and throw away your medicines at www.disposemymeds.org.      Notice  As of 10/30/2018  5:04 PM    You have not been prescribed any medications.

## 2018-10-31 ENCOUNTER — RADIANT APPOINTMENT (OUTPATIENT)
Dept: CT IMAGING | Facility: CLINIC | Age: 38
End: 2018-10-31
Attending: INTERNAL MEDICINE
Payer: COMMERCIAL

## 2018-10-31 DIAGNOSIS — R79.89 ABNORMAL LFTS: ICD-10-CM

## 2018-10-31 DIAGNOSIS — R50.9 FEVER OF UNKNOWN ORIGIN: ICD-10-CM

## 2018-10-31 LAB
HAV IGM SERPL QL IA: NONREACTIVE
HBV SURFACE AG SERPL QL IA: NONREACTIVE
HCV AB SERPL QL IA: NONREACTIVE
MITOCHONDRIA M2 IGG SER-ACNC: 9 U/ML

## 2018-10-31 PROCEDURE — 74177 CT ABD & PELVIS W/CONTRAST: CPT | Performed by: RADIOLOGY

## 2018-10-31 PROCEDURE — 71250 CT THORAX DX C-: CPT | Performed by: RADIOLOGY

## 2018-10-31 RX ORDER — IOPAMIDOL 755 MG/ML
105 INJECTION, SOLUTION INTRAVASCULAR ONCE
Status: COMPLETED | OUTPATIENT
Start: 2018-10-31 | End: 2018-10-31

## 2018-10-31 RX ADMIN — IOPAMIDOL 105 ML: 755 INJECTION, SOLUTION INTRAVASCULAR at 07:55

## 2018-11-01 ENCOUNTER — TELEPHONE (OUTPATIENT)
Dept: PEDIATRICS | Facility: CLINIC | Age: 38
End: 2018-11-01

## 2018-11-01 ENCOUNTER — NURSE TRIAGE (OUTPATIENT)
Dept: NURSING | Facility: CLINIC | Age: 38
End: 2018-11-01

## 2018-11-01 DIAGNOSIS — D47.9 LYMPHOPROLIFERATIVE DISEASE (H): Primary | ICD-10-CM

## 2018-11-01 LAB
ANA PAT SER IF-IMP: ABNORMAL
ANA SER QL IF: ABNORMAL
ANA TITR SER IF: ABNORMAL {TITER}
LKM AB TITR SER IF: NORMAL {TITER}

## 2018-11-01 NOTE — TELEPHONE ENCOUNTER
Contacted Hem/onc new patient scheduling.  They are forwarding referral to BMT scheduling.      Chhaya Brothers  Primary Care   [facility] [place]

## 2018-11-01 NOTE — TELEPHONE ENCOUNTER
"Clinic Action Needed:Yes, Please call spouse Luz Stancer , patient is  requesting results today 11/1/18.    Reason for Call:Luz calling requesting results from labs on 10/26/18 and CT scan from 10/31/18.  Reporting patient is currently at work. Spouse stating she would like to update MD on patient's new rectal pain \"burning sensation\" \"seems to be more the muscle surrounding\" the rectum. Reporting symptoms remain the same as when patient was seen with no improvement. Patient is not present to triage further. Reviewed option to call back nurse line 24 hours a day.       Eunice Parisi RN  [facility]      "

## 2018-11-01 NOTE — TELEPHONE ENCOUNTER
I called the patient and informed him the CAT scan of the abdomen shows enlarged lymph nodes.  He has multiple lymph nodes the largest around 2 cm.  With this finding and the peripheral smear showing predominantly lymphocytes with smudge cell, I highly suspect lymphoproliferative disorder.    I informed the patient the next step would be to do a bone marrow biopsy with aspirate for diagnostic purposes.  He will also have to be referred to a hematologist/oncologist for consultation.  I will place the order for bone marrow biopsy.

## 2018-11-01 NOTE — TELEPHONE ENCOUNTER
Patient requested I talk to his wife.  She called me today and I discussed his issue.  All questions were answered.  She was informed with setting up a consultation with hematology/oncologist.  Informed her that I feel he has a lymphoproliferative disorder.

## 2018-11-01 NOTE — TELEPHONE ENCOUNTER
"Clinic Action Needed:Yes, Please call spouse Luz Stancer , requesting results today 11/1/18.  Reason for Call:Luz calling requesting results from labs on 10/26/18 and CT scans.  Reporting patient is currently at work. Spouse stating she would like to update MD on patient's new rectal pain \"burning sensation\" \"seems to be more the muscle surrounding\" the rectum. Patient is not present to triage further. Reviewed option to call back nurse line 24 hours a day.         Eunice Parisi RN  [facility]      "

## 2018-11-02 ENCOUNTER — OFFICE VISIT (OUTPATIENT)
Dept: PEDIATRICS | Facility: CLINIC | Age: 38
End: 2018-11-02
Payer: COMMERCIAL

## 2018-11-02 VITALS
OXYGEN SATURATION: 97 % | HEIGHT: 71 IN | HEART RATE: 85 BPM | TEMPERATURE: 99.4 F | DIASTOLIC BLOOD PRESSURE: 82 MMHG | SYSTOLIC BLOOD PRESSURE: 134 MMHG | BODY MASS INDEX: 23.1 KG/M2 | WEIGHT: 165 LBS

## 2018-11-02 DIAGNOSIS — K60.2 ANAL FISSURE: Primary | ICD-10-CM

## 2018-11-02 DIAGNOSIS — D47.9 LYMPHOPROLIFERATIVE DISEASE (H): ICD-10-CM

## 2018-11-02 PROCEDURE — 99214 OFFICE O/P EST MOD 30 MIN: CPT | Performed by: INTERNAL MEDICINE

## 2018-11-02 RX ORDER — NAPROXEN 500 MG/1
TABLET ORAL
COMMUNITY
Start: 2018-10-26 | End: 2020-11-09

## 2018-11-02 RX ORDER — HYDROCODONE BITARTRATE AND ACETAMINOPHEN 5; 325 MG/1; MG/1
1 TABLET ORAL EVERY 4 HOURS PRN
Qty: 30 TABLET | Refills: 0 | Status: SHIPPED | OUTPATIENT
Start: 2018-11-02 | End: 2020-11-09

## 2018-11-02 NOTE — MR AVS SNAPSHOT
After Visit Summary   11/2/2018    Toribio Miller    MRN: [id number]           Patient Information     Date Of Birth          1980        Visit Information        Provider Department      11/2/2018 3:10 PM Weston Coelho MD [facility]        Today's Diagnoses     Anal fissure    -  1    Lymphoproliferative disease (H)          Care Instructions      Taking a Sitz Bath  A sitz bath is a type of therapy done by sitting in warm, shallow water. It can help soothe pain, itching, and other symptoms in the anal and genital areas. It can also help keep these areas clean if you can t take a bath or shower. Sitz is from the [place] word sitzen, which means to sit.  Why a sitz bath is done  A sitz bath helps clean and treat certain problems in the anal area, genital area, and the perineum. The perineum is the area between the anus and the vulva in women. In men, it is between the anus and the scrotum. A sitz bath helps increase blood flow to these areas and relax the muscles.  A sitz bath may be done to:    Help ease pain and itching from hemorrhoids    Help ease pain from an anal fissure    Bathe and soothe the perineum after childbirth    Clean and soothe the anal area or perineum after surgery    Ease prostate pain during prostatitis or after a procedure    Help ease period cramps    Clean the anal and genital areas if you can t take a bath or shower  How a sitz bath is done  A sitz bath can be done in 2 ways: in a bathtub or using a sitz bath bowl.  In a bathtub  To take a sitz bath in a tub:    Make sure your bathtub is clean. Fill a clean bathtub with 3 to 4 inches of warm water. In some cases, your healthcare provider may tell you to use cold water instead.    Add salt or medicine to the water if advised by your healthcare provider.    Gently lower yourself down into the bathtub and sit on the bottom of the tub. Don t get into the bath unless the water temperature is  comfortable.    Hold on to a railing. Or ask for help from a family member, friend, or caregiver if needed.  If you have a wound, the water may cause pain at first. But the pain should ease. Make sure the area that needs treating is under the water. You can bend your knees up to help expose the area that needs contact with the water.  Using a sitz bath bowl  A sitz bath bowl is a special plastic container that s placed on a toilet. You can buy this in many [employer] and medical supply stores. To take a sitz bath this way:    Lift the toilet lid and seat. Place a cleaned plastic sitz bath bowl on the rim of your toilet. Make sure the bowl is firmly in place and won t move around.    Fill the sitz bath bowl with warm water from a pitcher or other container. The water should cover your perineum. Make sure the water temperature is comfortable.    Add salt or medicine to the water if advised by your healthcare provider. Or follow the package instructions about how to fill the bowl. Some kits come with a plastic bag and tubing. This lets you stream water into the bowl and at the area of your body that needs treatment. The bowl may have a slot or hole in the back. This lets water flow out so it doesn t overflow onto the floor. If there is no hole, be careful not to fill the bowl too full.    Gently sit down on the sitz bath bowl. Hold on to a railing. Or ask for help from a family member, friend, or caregiver if needed.  If you have a wound, the water may cause pain at first, but the pain should ease. Make sure the area that needs treating is under the water.  For any type of sitz bath:  1. Sit in the water for 10 to 20 minutes.  2. Add more warm water as needed to keep the water comfortable.  3. Get up slowly from the tub or toilet. You may feel lightheaded or dizzy. Hold on to a railing. Or ask for help from a family member, friend, or caregiver if needed.  4. Gently pat your anal area, perineum, and genitals dry with a  clean towel. Don t rub the area.  5. Wash your hands. Put any ointment or cream on the area, if advised.  6. Wash the bathtub or sitz bath bowl with soap and water after each use.  7. Use a sitz bath 2 to 3 times a day, or as often as your healthcare provider advises.  When to call your healthcare provider  Call your healthcare provider right away if you have any of these:    Fever of 100.4 F (38 C) or higher, or as directed    Redness, swelling, or fluid leaking from a cut (incision) that gets worse    Pain that gets worse    Symptoms that don t get better, or get worse    New symptoms   Date Last Reviewed: 5/1/2016 2000-2017 [employer]. [address] [place] [address]. All rights reserved. This information is not intended as a substitute for professional medical care. Always follow your healthcare professional's instructions.                Follow-ups after your visit        Follow-up notes from your care team     Return if symptoms worsen or fail to improve.      Your next 10 appointments already scheduled     Nov 19, 2018 12:45 PM CST   New Visit with Jamal Lassiter MD   [facility] ([facility])    [id number] [place] [id number] [phone]              Who to contact     If you have questions or need follow up information about today's clinic visit or your schedule please contact [facility] directly at [phone].  Normal or non-critical lab and imaging results will be communicated to you by MyChart, letter or phone within 4 business days after the clinic has received the results. If you do not hear from us within 7 days, please contact the clinic through MyChart or phone. If you have a critical or abnormal lab result, we will notify you by phone as soon as possible.  Submit refill requests through [employer] or call your pharmacy and they will forward the refill request to us. Please allow 3 business days for your  "refill to be completed.          Additional Information About Your Visit        [employer] Information     [employer] gives you secure access to your electronic health record. If you see a primary care provider, you can also send messages to your care team and make appointments. If you have questions, please call your primary care clinic.  If you do not have a primary care provider, please call [phone] and they will assist you.      [employer] is an electronic gateway that provides easy, online access to your medical records. With [employer], you can request a clinic appointment, read your test results, renew a prescription or communicate with your care team.     To access your existing account, please contact your [facility] or call [phone] for assistance.        Care EveryWhere ID     This is your Care EveryWhere ID. This could be used by other organizations to access your [place] medical records  [id number]        Your Vitals Were     Pulse Temperature Height Pulse Oximetry BMI (Body Mass Index)       85 99.4  F (37.4  C) (Temporal) 5' 10.5\" (1.791 m) 97% 23.34 kg/m2        Blood Pressure from Last 3 Encounters:   11/02/18 134/82   10/30/18 121/82   10/26/18 148/72    Weight from Last 3 Encounters:   11/02/18 165 lb (74.8 kg)   10/30/18 171 lb 3.2 oz (77.7 kg)   10/26/18 172 lb (78 kg)              Today, you had the following     No orders found for display         Today's Medication Changes          These changes are accurate as of 11/2/18  4:11 PM.  If you have any questions, ask your nurse or doctor.               Start taking these medicines.        Dose/Directions    HYDROcodone-acetaminophen 5-325 MG per tablet   Commonly known as:  NORCO   Used for:  Lymphoproliferative disease (H)   Started by:  Weston Coelho MD        Dose:  1 tablet   Take 1 tablet by mouth every 4 hours as needed for pain   Quantity:  30 tablet   Refills:  0       hydrocortisone 2.5 % cream   Commonly " known as:  ANUSOL-HC   Used for:  Anal fissure   Started by:  Weston Coelho MD        Place rectally 2 times daily as needed for hemorrhoids   Quantity:  30 g   Refills:  0            Where to get your medicines      These medications were sent to [facility] - [place], [place] - [address], [address]  [address], [address], [facility] [address]     Phone:  [phone]     hydrocortisone 2.5 % cream         Some of these will need a paper prescription and others can be bought over the counter.  Ask your nurse if you have questions.     Bring a paper prescription for each of these medications     HYDROcodone-acetaminophen 5-325 MG per tablet               Information about OPIOIDS     PRESCRIPTION OPIOIDS: WHAT YOU NEED TO KNOW   We gave you an opioid (narcotic) pain medicine. It is important to manage your pain, but opioids are not always the best choice. You should first try all the other options your care team gave you. Take this medicine for as short a time (and as few doses) as possible.    Some activities can increase your pain, such as bandage changes or therapy sessions. It may help to take your pain medicine 30 to 60 minutes before these activities. Reduce your stress by getting enough sleep, working on hobbies you enjoy and practicing relaxation or meditation. Talk to your care team about ways to manage your pain beyond prescription opioids.    These medicines have risks:    DO NOT drive when on new or higher doses of pain medicine. These medicines can affect your alertness and reaction times, and you could be arrested for driving under the influence (DUI). If you need to use opioids long-term, talk to your care team about driving.    DO NOT operate heavy machinery    DO NOT do any other dangerous activities while taking these medicines.    DO NOT drink any alcohol while taking these medicines.     If the opioid prescribed includes acetaminophen, DO NOT take with any  other medicines that contain acetaminophen. Read all labels carefully. Look for the word  acetaminophen  or  Tylenol.  Ask your pharmacist if you have questions or are unsure.    You can get addicted to pain medicines, especially if you have a history of addiction (chemical, alcohol or substance dependence). Talk to your care team about ways to reduce this risk.    All opioids tend to cause constipation. Drink plenty of water and eat foods that have a lot of fiber, such as fruits, vegetables, prune juice, apple juice and high-fiber cereal. Take a laxative (Miralax, milk of magnesia, Colace, Senna) if you don t move your bowels at least every other day. Other side effects include upset stomach, sleepiness, dizziness, throwing up, tolerance (needing more of the medicine to have the same effect), physical dependence and slowed breathing.    Store your pills in a secure place, locked if possible. We will not replace any lost or stolen medicine. If you don t finish your medicine, please throw away (dispose) as directed by your pharmacist. The [place] Pollution Control Agency has more information about safe disposal: [email or web address]         Primary Care Provider Office Phone # Fax #    [facility] [phone] [address] [facility] [address]        Equal Access to Services     HAMZAH TRAMMELL : Zabrina theodore hadasho Soomaali, waaxda luqadaha, qaybta kaalmada adeegyada, willie christianson. So [facility] [phone].    ATENCIÓN: Si habla español, tiene a rangel disposición servicios gratuitos de asistencia lingüística. Abrilame al [phone].    We comply with applicable federal civil rights laws and [place] laws. We do not discriminate on the basis of race, color, national origin, age, disability, sex, sexual orientation, or gender identity.            Thank you!     Thank you for choosing [facility]   for your care. Our goal is always to provide you with excellent care. Hearing back from our patients is one way we can continue to improve our services. Please take a few minutes to complete the written survey that you may receive in the mail after your visit with us. Thank you!             Your Updated Medication List - Protect others around you: Learn how to safely use, store and throw away your medicines at www.disposemymeds.org.          This list is accurate as of 11/2/18  4:11 PM.  Always use your most recent med list.                   Brand Name Dispense Instructions for use Diagnosis    HYDROcodone-acetaminophen 5-325 MG per tablet    NORCO    30 tablet    Take 1 tablet by mouth every 4 hours as needed for pain    Lymphoproliferative disease (H)       hydrocortisone 2.5 % cream    ANUSOL-HC    30 g    Place rectally 2 times daily as needed for hemorrhoids    Anal fissure       naproxen 500 MG tablet    NAPROSYN          TYLENOL PO      Take 650 mg by mouth every 8 hours as needed for mild pain or fever

## 2018-11-02 NOTE — PATIENT INSTRUCTIONS
Taking a Sitz Bath  A sitz bath is a type of therapy done by sitting in warm, shallow water. It can help soothe pain, itching, and other symptoms in the anal and genital areas. It can also help keep these areas clean if you can t take a bath or shower. Sitz is from the [place] citizen word sitzen, which means to sit.  Why a sitz bath is done  A sitz bath helps clean and treat certain problems in the anal area, genital area, and the perineum. The perineum is the area between the anus and the vulva in women. In men, it is between the anus and the scrotum. A sitz bath helps increase blood flow to these areas and relax the muscles.  A sitz bath may be done to:    Help ease pain and itching from hemorrhoids    Help ease pain from an anal fissure    Bathe and soothe the perineum after childbirth    Clean and soothe the anal area or perineum after surgery    Ease prostate pain during prostatitis or after a procedure    Help ease period cramps    Clean the anal and genital areas if you can t take a bath or shower  How a sitz bath is done  A sitz bath can be done in 2 ways: in a bathtub or using a sitz bath bowl.  In a bathtub  To take a sitz bath in a tub:    Make sure your bathtub is clean. Fill a clean bathtub with 3 to 4 inches of warm water. In some cases, your healthcare provider may tell you to use cold water instead.    Add salt or medicine to the water if advised by your healthcare provider.    Gently lower yourself down into the bathtub and sit on the bottom of the tub. Don t get into the bath unless the water temperature is comfortable.    Hold on to a railing. Or ask for help from a family member, friend, or caregiver if needed.  If you have a wound, the water may cause pain at first. But the pain should ease. Make sure the area that needs treating is under the water. You can bend your knees up to help expose the area that needs contact with the water.  Using a sitz bath bowl  A sitz bath bowl is a special plastic  container that s placed on a toilet. You can buy this in many drugstores and medical supply stores. To take a sitz bath this way:    Lift the toilet lid and seat. Place a cleaned plastic sitz bath bowl on the rim of your toilet. Make sure the bowl is firmly in place and won t move around.    Fill the sitz bath bowl with warm water from a pitcher or other container. The water should cover your perineum. Make sure the water temperature is comfortable.    Add salt or medicine to the water if advised by your healthcare provider. Or follow the package instructions about how to fill the bowl. Some kits come with a plastic bag and tubing. This lets you stream water into the bowl and at the area of your body that needs treatment. The bowl may have a slot or hole in the back. This lets water flow out so it doesn t overflow onto the floor. If there is no hole, be careful not to fill the bowl too full.    Gently sit down on the sitz bath bowl. Hold on to a railing. Or ask for help from a family member, friend, or caregiver if needed.  If you have a wound, the water may cause pain at first, but the pain should ease. Make sure the area that needs treating is under the water.  For any type of sitz bath:  1. Sit in the water for 10 to 20 minutes.  2. Add more warm water as needed to keep the water comfortable.  3. Get up slowly from the tub or toilet. You may feel lightheaded or dizzy. Hold on to a railing. Or ask for help from a family member, friend, or caregiver if needed.  4. Gently pat your anal area, perineum, and genitals dry with a clean towel. Don t rub the area.  5. Wash your hands. Put any ointment or cream on the area, if advised.  6. Wash the bathtub or sitz bath bowl with soap and water after each use.  7. Use a sitz bath 2 to 3 times a day, or as often as your healthcare provider advises.  When to call your healthcare provider  Call your healthcare provider right away if you have any of these:    Fever of 100.4 F  (38 C) or higher, or as directed    Redness, swelling, or fluid leaking from a cut (incision) that gets worse    Pain that gets worse    Symptoms that don t get better, or get worse    New symptoms   Date Last Reviewed: 5/1/2016 2000-2017 [employer]. [address] [place] [address]. All rights reserved. This information is not intended as a substitute for professional medical care. Always follow your healthcare professional's instructions.

## 2018-11-02 NOTE — PROGRESS NOTES
SUBJECTIVE:   Toribio Miller is a 38 year old male who presents to clinic today for the following health issues:      Follow up on body aches.  Patient states the only place he has pain now is rectal pain.    HPI    Patient came in accompanied by his wife to discuss the recently found enlarged lymph nodes in his abdomen on a CAT scan.  For the past 2 days he has been having rectal pain.  He went to wipe is painful and when he moves his bowel is very painful right at the anus.  No blood in the stool.  He has not been constipated.  The patient and wife wanted to go over all the lab findings thus far and the diagnostic x-rays.  His aching is little better than before.  Still feels fatigued.  Continues to have night sweats.    Problem list and histories reviewed & adjusted, as indicated.  Additional history: as documented    Patient Active Problem List   Diagnosis     Lymphoproliferative disease (H)     Anal fissure     Past Surgical History:   Procedure Laterality Date     ORTHOPEDIC SURGERY  10yrs ago per patient    left ankle        Social History   Substance Use Topics     Smoking status: Former Smoker     Smokeless tobacco: Current User     Types: Chew     Alcohol use Yes     History reviewed. No pertinent family history.      Current Outpatient Prescriptions   Medication Sig Dispense Refill     Acetaminophen (TYLENOL PO) Take 650 mg by mouth every 8 hours as needed for mild pain or fever       HYDROcodone-acetaminophen (NORCO) 5-325 MG per tablet Take 1 tablet by mouth every 4 hours as needed for pain 30 tablet 0     hydrocortisone (ANUSOL-HC) 2.5 % cream Place rectally 2 times daily as needed for hemorrhoids 30 g 0     naproxen (NAPROSYN) 500 MG tablet        No Known Allergies    Reviewed and updated as needed this visit by clinical staff  Tobacco  Allergies  Meds  Med Hx  Surg Hx  Fam Hx  Soc Hx      Reviewed and updated as needed this visit by Provider         ROS:  Constitutional, HEENT,  "cardiovascular, pulmonary, GI, , musculoskeletal, neuro, skin, endocrine and psych systems are negative, except as otherwise noted.    OBJECTIVE:     /82 (BP Location: Right arm, Patient Position: Sitting, Cuff Size: Adult Regular)  Pulse 85  Temp 99.4  F (37.4  C) (Temporal)  Ht 5' 10.5\" (1.791 m)  Wt 165 lb (74.8 kg)  SpO2 97%  BMI 23.34 kg/m2  Body mass index is 23.34 kg/(m^2).  GENERAL: healthy, alert and no distress  NECK: no adenopathy, no asymmetry, masses, or scars and thyroid normal to palpation  RESP: lungs clear to auscultation - no rales, rhonchi or wheezes  CV: regular rate and rhythm, normal S1 S2, no S3 or S4, no murmur, click or rub, no peripheral edema and peripheral pulses strong  ABDOMEN: soft, nontender, no hepatosplenomegaly, no masses and bowel sounds normal  RECTAL (male): normal sphincter tone, no rectal masses, prostate of normal size for age, smooth, nontender without masses/nodules and anal fissure at 9 o'clock positon  MS: no gross musculoskeletal defects noted, no edema    Diagnostic Test Results:  Results for orders placed or performed in visit on 10/31/18   CT Chest w/o Contrast    Narrative    Examination: CT CHEST W/O CONTRAST, 10/31/2018 8:03 AM     History: ; Fever of unknown origin; Abnormal LFTs    Comparison: 10/30/2018 chest radiograph    TECHNIQUE: Helical acquisition of CT images from the lung apices to  the kidneys without IV contrast. Coronal and axial MIP images were  reconstructed from the source data.    FINDINGS:     Lungs:  No pleural effusion or pneumothorax. No focal consolidation. 5 mm  nodule along the right minor fissure (series 6, image 160). Calcified  granuloma in the left upper lobe. The central tracheobronchial tree is  clear. No bronchial wall thickening or bronchiectasis.    Chest:   Normal heart size. No pericardial effusion. Normal caliber ascending  aorta and main pulmonary artery. No thoracic lymphadenopathy.    Upper abdomen: Limited " evaluation of the upper abdomen. The spleen is  incompletely imaged but likely mildly enlarged.    Bones: No acute or aggressive osseus abnormality.      Impression    IMPRESSION:   1. No acute airspace disease.  2. 5 mm nodule along the right minor fissure, likely a fissural lymph  node. An optional follow-up chest CT in 12 months can be obtained if  the patient is high risk for lung cancer.  3. The spleen is incompletely imaged but likely mildly enlarged.    ZIYAD JACOBSEN, DO       ASSESSMENT/PLAN:     1.  Anal fissure small.  On digital exam the rectum was empty no lesions felt there but definite tenderness of the anus and on anoscopic exam a small to tear visualized.  I can actually feel the area of tenderness on digital exam as well.  Anusol  cream prescribed and advised to do shave sitz bath.  2.  Lymphoproliferative disorder suspected based on predominant lymphocytosis on peripheral smear smear and enlarged lymph nodes in the abdomen CT.  The next step is a bone marrow biopsy.  A referral has been sent to the oncology department.  Patient and wife had a lot of questions related to this findings and I discussed them at length.  They have an appointment for the 19th and they would like to be seen sooner or at least get the bone marrow done's sooner.      Weston Coelho MD  [facility]

## 2018-11-05 ENCOUNTER — TELEPHONE (OUTPATIENT)
Dept: PEDIATRICS | Facility: CLINIC | Age: 38
End: 2018-11-05

## 2018-11-05 DIAGNOSIS — K60.2 RECTAL FISSURE: ICD-10-CM

## 2018-11-05 DIAGNOSIS — K62.89 RECTAL PAIN: Primary | ICD-10-CM

## 2018-11-05 NOTE — TELEPHONE ENCOUNTER
Patient is still having severe rectal pain.  States he had to take a bath this morning in order to be able to urinate.  Patient states he had a MB this morning and a dark flat dime size object came out of his rectum and now he isnt in as much pain.    I advised patient that I told Dr. Coelho about the symptoms above and that after many phone calls back and forth with patient this morning patient has decided he needs to be seen by a gastroenterologist today asap.        I scheduled patient an appt at [facility] in [place] this morning at 11:10 am.  Patients recent office visits all faxed to [facility] at fax# [phone].     Message routed to Dr. Coelho to sign referral.    Laurita Corbett CMA

## 2018-11-06 DIAGNOSIS — D47.9 LYMPHOPROLIFERATIVE DISEASE (H): Primary | ICD-10-CM

## 2018-11-06 NOTE — PROGRESS NOTES
ONC Adult Bone Marrow Biopsy Procedure Note  November 7, 2018  /85  Pulse 98  Temp 98.5  F (36.9  C) (Oral)  Resp 16  SpO2 96%   Results for orders placed or performed in visit on 11/07/18   *CBC with platelets differential   Result Value Ref Range    WBC 9.3 4.0 - 11.0 10e9/L    RBC Count 5.06 4.4 - 5.9 10e12/L    Hemoglobin 14.8 13.3 - 17.7 g/dL    Hematocrit 44.8 40.0 - 53.0 %    MCV 89 78 - 100 fl    MCH 29.2 26.5 - 33.0 pg    MCHC 33.0 31.5 - 36.5 g/dL    RDW 13.6 10.0 - 15.0 %    Platelet Count 344 150 - 450 10e9/L    % Neutrophils 36.0 %    % Lymphocytes 56.0 %    % Monocytes 7.0 %    % Basophils 1.0 %    Absolute Neutrophil 3.3 1.6 - 8.3 10e9/L    Absolute Lymphocytes 5.2 0.8 - 5.3 10e9/L    Absolute Monocytes 0.7 0.0 - 1.3 10e9/L    Absolute Basophils 0.1 0.0 - 0.2 10e9/L    Smudge Cells Present     Reactive Lymphs Present     RBC Morphology Normal     Platelet Estimate       Automated count confirmed.  Platelet morphology is normal.    Diff Method Manual Differential      Learning needs assessment complete within 12 months? NO    DIAGNOSIS: lymphocytosis with enlarged multiple abdominal lymph nodes    PROCEDURE: Unilateral Bone Marrow Biopsy and Unilateral Aspirate    LOCATION: [facility]    Patient s identification was positively verified by verbal identification and invasive procedure safety checklist was completed. Informed consent was obtained. Following the administration of Midazolam 1 mg IV as pre-medication, patient was placed in the prone position and prepped and draped in a sterile manner. Approximately 5 cc of 1% Lidocaine was used over the left posterior iliac spine. Following this a 3 mm incision was made. Trephine bone marrow core(s) was (were) obtained from the [facility]. Bone marrow aspirates were obtained from the [facility]. Aspirates were sent for morphology, immunophenotyping, cytogenetics and molecular diagnostics. A total of approximately 20 ml of marrow was  aspirated. Following this procedure a sterile dressing was applied to the bone marrow biopsy site(s). The patient was placed in the supine position to maintain pressure on the biopsy site. Post-procedure wound care instructions were given.     Complications: NO    Pre-procedural pain: anal fissure pain only     Post-procedural pain assessment: 1 out of 10 on the numeric pain rating scale.     Interventions: NO    Length of procedure:20 minutes or less      Procedure performed by: Savannah Bhatti CNP

## 2018-11-07 ENCOUNTER — ONCOLOGY VISIT (OUTPATIENT)
Dept: ONCOLOGY | Facility: CLINIC | Age: 38
End: 2018-11-07
Payer: COMMERCIAL

## 2018-11-07 VITALS
SYSTOLIC BLOOD PRESSURE: 126 MMHG | RESPIRATION RATE: 16 BRPM | TEMPERATURE: 98.3 F | OXYGEN SATURATION: 96 % | DIASTOLIC BLOOD PRESSURE: 74 MMHG | HEART RATE: 84 BPM

## 2018-11-07 DIAGNOSIS — D72.820 LYMPHOCYTOSIS: Primary | ICD-10-CM

## 2018-11-07 DIAGNOSIS — D47.9 LYMPHOPROLIFERATIVE DISEASE (H): Primary | ICD-10-CM

## 2018-11-07 DIAGNOSIS — D47.9 LYMPHOPROLIFERATIVE DISEASE (H): ICD-10-CM

## 2018-11-07 LAB
BASOPHILS # BLD AUTO: 0.1 10E9/L (ref 0–0.2)
BASOPHILS NFR BLD AUTO: 1 %
DIFFERENTIAL METHOD BLD: NORMAL
ERYTHROCYTE [DISTWIDTH] IN BLOOD BY AUTOMATED COUNT: 13.6 % (ref 10–15)
HCT VFR BLD AUTO: 44.8 % (ref 40–53)
HGB BLD-MCNC: 14.8 G/DL (ref 13.3–17.7)
LYMPHOCYTES # BLD AUTO: 5.2 10E9/L (ref 0.8–5.3)
LYMPHOCYTES NFR BLD AUTO: 56 %
MCH RBC QN AUTO: 29.2 PG (ref 26.5–33)
MCHC RBC AUTO-ENTMCNC: 33 G/DL (ref 31.5–36.5)
MCV RBC AUTO: 89 FL (ref 78–100)
MONOCYTES # BLD AUTO: 0.7 10E9/L (ref 0–1.3)
MONOCYTES NFR BLD AUTO: 7 %
NEUTROPHILS # BLD AUTO: 3.3 10E9/L (ref 1.6–8.3)
NEUTROPHILS NFR BLD AUTO: 36 %
PLATELET # BLD AUTO: 344 10E9/L (ref 150–450)
PLATELET # BLD EST: NORMAL 10*3/UL
RBC # BLD AUTO: 5.06 10E12/L (ref 4.4–5.9)
RBC MORPH BLD: NORMAL
SMUDGE CELLS BLD QL SMEAR: PRESENT
VARIANT LYMPHS BLD QL SMEAR: PRESENT
WBC # BLD AUTO: 9.3 10E9/L (ref 4–11)

## 2018-11-07 PROCEDURE — 00000161 ZZHCL STATISTIC H-SPHEME PROCESS B/S: Performed by: INTERNAL MEDICINE

## 2018-11-07 PROCEDURE — 88305 TISSUE EXAM BY PATHOLOGIST: CPT | Performed by: INTERNAL MEDICINE

## 2018-11-07 PROCEDURE — 88237 TISSUE CULTURE BONE MARROW: CPT | Performed by: INTERNAL MEDICINE

## 2018-11-07 PROCEDURE — 00000058 ZZHCL STATISTIC BONE MARROW ASP PERF TC 38220: Performed by: INTERNAL MEDICINE

## 2018-11-07 PROCEDURE — 88275 CYTOGENETICS 100-300: CPT | Performed by: INTERNAL MEDICINE

## 2018-11-07 PROCEDURE — 88313 SPECIAL STAINS GROUP 2: CPT | Performed by: INTERNAL MEDICINE

## 2018-11-07 PROCEDURE — 40000795 ZZHCL STATISTIC DNA PROCESS AND HOLD: Performed by: INTERNAL MEDICINE

## 2018-11-07 PROCEDURE — 88161 CYTOPATH SMEAR OTHER SOURCE: CPT | Mod: 59 | Performed by: INTERNAL MEDICINE

## 2018-11-07 PROCEDURE — 88342 IMHCHEM/IMCYTCHM 1ST ANTB: CPT | Performed by: INTERNAL MEDICINE

## 2018-11-07 PROCEDURE — 85025 COMPLETE CBC W/AUTO DIFF WBC: CPT | Performed by: NURSE PRACTITIONER

## 2018-11-07 PROCEDURE — 96374 THER/PROPH/DIAG INJ IV PUSH: CPT | Mod: 59

## 2018-11-07 PROCEDURE — 40000424 ZZHCL STATISTIC BONE MARROW CORE PERF TC 38221: Performed by: INTERNAL MEDICINE

## 2018-11-07 PROCEDURE — 85060 BLOOD SMEAR INTERPRETATION: CPT | Performed by: INTERNAL MEDICINE

## 2018-11-07 PROCEDURE — 38222 DX BONE MARROW BX & ASPIR: CPT | Performed by: NURSE PRACTITIONER

## 2018-11-07 PROCEDURE — 88271 CYTOGENETICS DNA PROBE: CPT | Performed by: INTERNAL MEDICINE

## 2018-11-07 PROCEDURE — 88184 FLOWCYTOMETRY/ TC 1 MARKER: CPT | Performed by: INTERNAL MEDICINE

## 2018-11-07 PROCEDURE — 88311 DECALCIFY TISSUE: CPT | Performed by: INTERNAL MEDICINE

## 2018-11-07 PROCEDURE — 88185 FLOWCYTOMETRY/TC ADD-ON: CPT | Performed by: INTERNAL MEDICINE

## 2018-11-07 PROCEDURE — 40000951 ZZHCL STATISTIC BONE MARROW INTERP TC 85097: Performed by: INTERNAL MEDICINE

## 2018-11-07 ASSESSMENT — PAIN SCALES - GENERAL
PAINLEVEL: MODERATE PAIN (4)
PAINLEVEL: MILD PAIN (3)

## 2018-11-07 NOTE — PROGRESS NOTES
"Patient presents for bone marrow biopsy.  Discussed procedure with patient.  Vitals obtained and stable.  Lab staff present during PIV start and lab drawn.  Savannah Bhatti CNP met with patient and consent was signed.  Pre-medications administered, patient positioned in prone position.  Patient tolerated procedure well.  Post observation x 30\", Juice and snack provided. PIV D/C'd, cathlon intact.  Dressing assessed; no bleeding. Written discharge instructions reviewed with patient.  Verbalized understanding.  Brother accompanied patient to the car and will drive patient home.  Patient discharged at 1050. Matilde Ibarra RN  BSN OCN      "

## 2018-11-07 NOTE — MR AVS SNAPSHOT
After Visit Summary   11/7/2018    Toribio Miller    MRN: [id number]           Patient Information     Date Of Birth          1980        Visit Information        Provider Department      11/7/2018 9:45 AM Savannah Bhatti APRN CNP [facility]        Today's Diagnoses     Lymphocytosis    -  1    Lymphoproliferative disease (H)           Follow-ups after your visit        Your next 10 appointments already scheduled     Nov 19, 2018 12:45 PM CST   New Visit with Jamal [facility]assiter MD   [facility]    [address] [place] [id number] [phone]              Who to contact     If you have questions or need follow up information about today's clinic visit or your schedule please contact [facility] directly at [phone].  Normal or non-critical lab and imaging results will be communicated to you by [employer], letter or phone within 4 business days after the clinic has received the results. If you do not hear from us within 7 days, please contact the clinic through [employer] or phone. If you have a critical or abnormal lab result, we will notify you by phone as soon as possible.  Submit refill requests through [employer] or call your pharmacy and they will forward the refill request to us. Please allow 3 business days for your refill to be completed.          Additional Information About Your Visit        MyChart Information     [employer] gives you secure access to your electronic health record. If you see a primary care provider, you can also send messages to your care team and make appointments. If you have questions, please call your primary care clinic.  If you do not have a primary care provider, please call [phone] and they will assist you.      [employer] is an electronic gateway that provides easy, online access to your medical records. With [employer], you can request a clinic appointment, read your test  results, renew a prescription or communicate with your care team.     To access your existing account, please contact your [facility] or call [phone] for assistance.        Care EveryWhere ID     This is your Care EveryWhere ID. This could be used by other organizations to access your [place] medical records  [id number]        Your Vitals Were     Pulse Temperature Respirations Pulse Oximetry          84 98.3  F (36.8  C) 16 96%         Blood Pressure from Last 3 Encounters:   11/07/18 126/74   11/02/18 134/82   10/30/18 121/82    Weight from Last 3 Encounters:   11/02/18 74.8 kg (165 lb)   10/30/18 77.7 kg (171 lb 3.2 oz)   10/26/18 78 kg (172 lb)              We Performed the Following     *CBC with platelets differential     Bone Marrow Biopsy (Charge)     Bone marrow biopsy     Bone Marrow; Aspiration Only (Charge)     FISH With Professional Interpretation     Leukemia Lymphoma Evaluation (Flow Cytometry)     Process and hold DNA - Bone Marrow        Primary Care Provider Office Phone # Fax #    [facility] [phone] [address] [facility] [address]        Equal Access to Services     HAMZAH TRAMMELL : Hadii aad ku hadasho Sodotty, waaxda luqadaha, qaybta kaalmada rafaela, willie bhatt . So [facility] [phone].    ATENCIÓN: Si habla español, tiene a rangel disposición servicios gratuitos de asistencia lingüística. Llame al [phone].    We comply with applicable federal civil rights laws and [place] laws. We do not discriminate on the basis of race, color, national origin, age, disability, sex, sexual orientation, or gender identity.            Thank you!     Thank you for choosing [facility]  for your care. Our goal is always to provide you with excellent care. Hearing back from our patients is one way we can continue to improve our services. Please take a few minutes to complete the  written survey that you may receive in the mail after your visit with us. Thank you!             Your Updated Medication List - Protect others around you: Learn how to safely use, store and throw away your medicines at www.disposemymeds.org.          This list is accurate as of 11/7/18  1:09 PM.  Always use your most recent med list.                   Brand Name Dispense Instructions for use Diagnosis    HYDROcodone-acetaminophen 5-325 MG per tablet    NORCO    30 tablet    Take 1 tablet by mouth every 4 hours as needed for pain    Lymphoproliferative disease (H)       hydrocortisone 2.5 % cream    ANUSOL-HC    30 g    Place rectally 2 times daily as needed for hemorrhoids    Anal fissure       naproxen 500 MG tablet    NAPROSYN          TYLENOL PO      Take 650 mg by mouth every 8 hours as needed for mild pain or fever

## 2018-11-07 NOTE — LETTER
11/7/2018         RE: Toribio Miller  [address]  [place] [address]        Dear Colleague,    Thank you for referring your patient, Toribio Miller, to the [facility]. Please see a copy of my visit note below.    ONC Adult Bone Marrow Biopsy Procedure Note  November 7, 2018  /85  Pulse 98  Temp 98.5  F (36.9  C) (Oral)  Resp 16  SpO2 96%   Results for orders placed or performed in visit on 11/07/18   *CBC with platelets differential   Result Value Ref Range    WBC 9.3 4.0 - 11.0 10e9/L    RBC Count 5.06 4.4 - 5.9 10e12/L    Hemoglobin 14.8 13.3 - 17.7 g/dL    Hematocrit 44.8 40.0 - 53.0 %    MCV 89 78 - 100 fl    MCH 29.2 26.5 - 33.0 pg    MCHC 33.0 31.5 - 36.5 g/dL    RDW 13.6 10.0 - 15.0 %    Platelet Count 344 150 - 450 10e9/L    % Neutrophils 36.0 %    % Lymphocytes 56.0 %    % Monocytes 7.0 %    % Basophils 1.0 %    Absolute Neutrophil 3.3 1.6 - 8.3 10e9/L    Absolute Lymphocytes 5.2 0.8 - 5.3 10e9/L    Absolute Monocytes 0.7 0.0 - 1.3 10e9/L    Absolute Basophils 0.1 0.0 - 0.2 10e9/L    Smudge Cells Present     Reactive Lymphs Present     RBC Morphology Normal     Platelet Estimate       Automated count confirmed.  Platelet morphology is normal.    Diff Method Manual Differential      Learning needs assessment complete within 12 months? NO    DIAGNOSIS: lymphocytosis with enlarged multiple abdominal lymph nodes    PROCEDURE: Unilateral Bone Marrow Biopsy and Unilateral Aspirate    LOCATION: [facility]    Patient s identification was positively verified by verbal identification and invasive procedure safety checklist was completed. Informed consent was obtained. Following the administration of Midazolam 1 mg IV as pre-medication, patient was placed in the prone position and prepped and draped in a sterile manner. Approximately 5 cc of 1% Lidocaine was used over the left posterior iliac spine. Following this a 3 mm incision was made. Trephine bone  marrow core(s) was (were) obtained from the [facility]. Bone marrow aspirates were obtained from the [facility]. Aspirates were sent for morphology, immunophenotyping, cytogenetics and molecular diagnostics. A total of approximately 20 ml of marrow was aspirated. Following this procedure a sterile dressing was applied to the bone marrow biopsy site(s). The patient was placed in the supine position to maintain pressure on the biopsy site. Post-procedure wound care instructions were given.     Complications: NO    Pre-procedural pain: anal fissure pain only     Post-procedural pain assessment: 1 out of 10 on the numeric pain rating scale.     Interventions: NO    Length of procedure:20 minutes or less      Procedure performed by: Savannah Bhatti CNP      Again, thank you for allowing me to participate in the care of your patient.        Sincerely,        Savannah Bhatti NP, APRN CNP

## 2018-11-07 NOTE — MR AVS SNAPSHOT
After Visit Summary   11/7/2018    Toribio Miller    MRN: [id number]           Patient Information     Date Of Birth          1980        Visit Information        Provider Department      11/7/2018 8:45 AM NURSE ONLY CANCER CENTER [facility]        Today's Diagnoses     Lymphoproliferative disease (H)    -  1       Follow-ups after your visit        Your next 10 appointments already scheduled     Nov 19, 2018 12:45 PM CST   New Visit with Jamal Lassiter MD   [facility]    [id number] [place] [address] [phone]              Who to contact     If you have questions or need follow up information about today's clinic visit or your schedule please contact [facility] directly at [phone].  Normal or non-critical lab and imaging results will be communicated to you by [employer], letter or phone within 4 business days after the clinic has received the results. If you do not hear from us within 7 days, please contact the clinic through [employer] or phone. If you have a critical or abnormal lab result, we will notify you by phone as soon as possible.  Submit refill requests through [employer] or call your pharmacy and they will forward the refill request to us. Please allow 3 business days for your refill to be completed.          Additional Information About Your Visit        [employer] Information     [employer] gives you secure access to your electronic health record. If you see a primary care provider, you can also send messages to your care team and make appointments. If you have questions, please call your primary care clinic.  If you do not have a primary care provider, please call [phone] and they will assist you.      [employer] is an electronic gateway that provides easy, online access to your medical records. With [employer], you can request a clinic appointment, read your test results, renew a  prescription or communicate with your care team.     To access your existing account, please contact your [facility] or call [phone] for assistance.        Care EveryWhere ID     This is your Care EveryWhere ID. This could be used by other organizations to access your [place] medical records  [id number]         Blood Pressure from Last 3 Encounters:   11/07/18 126/74   11/02/18 134/82   10/30/18 121/82    Weight from Last 3 Encounters:   11/02/18 74.8 kg (165 lb)   10/30/18 77.7 kg (171 lb 3.2 oz)   10/26/18 78 kg (172 lb)              Today, you had the following     No orders found for display       Primary Care Provider Office Phone # Fax #    [facility] [phone] [address] [facility] [address]        Equal Access to Services     HAMZAH TRAMMELL : Hadii denver theodore hadasho Sodotty, waaxda luqadaha, qaybta kaalmada adeegyada, willie haddad hayakua bhatt . So [facility] [phone].    ATENCIÓN: Si habla español, tiene a rangel disposición servicios gratuitos de asistencia lingüística. Llame al [phone].    We comply with applicable federal civil rights laws and [place] laws. We do not discriminate on the basis of race, color, national origin, age, disability, sex, sexual orientation, or gender identity.            Thank you!     Thank you for choosing [facility]  for your care. Our goal is always to provide you with excellent care. Hearing back from our patients is one way we can continue to improve our services. Please take a few minutes to complete the written survey that you may receive in the mail after your visit with us. Thank you!             Your Updated Medication List - Protect others around you: Learn how to safely use, store and throw away your medicines at www.disposemymeds.org.          This list is accurate as of 11/7/18 10:54 AM.  Always use your most recent med list.                   Brand Name  Dispense Instructions for use Diagnosis    HYDROcodone-acetaminophen 5-325 MG per tablet    NORCO    30 tablet    Take 1 tablet by mouth every 4 hours as needed for pain    Lymphoproliferative disease (H)       hydrocortisone 2.5 % cream    ANUSOL-HC    30 g    Place rectally 2 times daily as needed for hemorrhoids    Anal fissure       naproxen 500 MG tablet    NAPROSYN          TYLENOL PO      Take 650 mg by mouth every 8 hours as needed for mild pain or fever

## 2018-11-09 LAB
COPATH REPORT: NORMAL

## 2018-11-12 NOTE — TELEPHONE ENCOUNTER
Date of appointment: 11/19/2018   Diagnosis/reason for appointment:Dx:Lymphoproliferative disease   Referring provider/facility: [facility]   Who called:    Recent Studies  Imaging:  Pathology:  Labs:  Previous chemo/radiation (if known):    Records requested from:  Records received from:    Additional information: recs in epic

## 2018-11-14 ENCOUNTER — MYC MEDICAL ADVICE (OUTPATIENT)
Dept: PEDIATRICS | Facility: CLINIC | Age: 38
End: 2018-11-14

## 2018-11-16 ENCOUNTER — MYC MEDICAL ADVICE (OUTPATIENT)
Dept: PEDIATRICS | Facility: CLINIC | Age: 38
End: 2018-11-16

## 2018-11-19 ENCOUNTER — ONCOLOGY VISIT (OUTPATIENT)
Dept: ONCOLOGY | Facility: CLINIC | Age: 38
End: 2018-11-19
Payer: COMMERCIAL

## 2018-11-19 ENCOUNTER — DOCUMENTATION ONLY (OUTPATIENT)
Dept: INTERVENTIONAL RADIOLOGY/VASCULAR | Facility: CLINIC | Age: 38
End: 2018-11-19

## 2018-11-19 ENCOUNTER — CARE COORDINATION (OUTPATIENT)
Dept: ONCOLOGY | Facility: CLINIC | Age: 38
End: 2018-11-19

## 2018-11-19 ENCOUNTER — PRE VISIT (OUTPATIENT)
Dept: ONCOLOGY | Facility: CLINIC | Age: 38
End: 2018-11-19

## 2018-11-19 VITALS
WEIGHT: 165.56 LBS | BODY MASS INDEX: 23.18 KG/M2 | SYSTOLIC BLOOD PRESSURE: 155 MMHG | OXYGEN SATURATION: 99 % | DIASTOLIC BLOOD PRESSURE: 92 MMHG | HEIGHT: 71 IN | TEMPERATURE: 97.8 F | HEART RATE: 93 BPM | RESPIRATION RATE: 16 BRPM

## 2018-11-19 DIAGNOSIS — D72.820 LARGE GRANULAR LYMPHOCYTOSIS: Primary | ICD-10-CM

## 2018-11-19 DIAGNOSIS — R59.1 LA (LYMPHADENOPATHY): ICD-10-CM

## 2018-11-19 LAB
COPATH REPORT: NORMAL
LDH SERPL L TO P-CCNC: 161 U/L (ref 85–227)

## 2018-11-19 PROCEDURE — 87389 HIV-1 AG W/HIV-1&-2 AB AG IA: CPT | Performed by: INTERNAL MEDICINE

## 2018-11-19 PROCEDURE — 83615 LACTATE (LD) (LDH) ENZYME: CPT | Performed by: INTERNAL MEDICINE

## 2018-11-19 PROCEDURE — 99205 OFFICE O/P NEW HI 60 MIN: CPT | Performed by: INTERNAL MEDICINE

## 2018-11-19 PROCEDURE — 88185 FLOWCYTOMETRY/TC ADD-ON: CPT | Performed by: INTERNAL MEDICINE

## 2018-11-19 PROCEDURE — 88184 FLOWCYTOMETRY/ TC 1 MARKER: CPT | Performed by: INTERNAL MEDICINE

## 2018-11-19 PROCEDURE — 36415 COLL VENOUS BLD VENIPUNCTURE: CPT | Performed by: INTERNAL MEDICINE

## 2018-11-19 ASSESSMENT — PAIN SCALES - GENERAL: PAINLEVEL: NO PAIN (0)

## 2018-11-19 NOTE — PROGRESS NOTES
Received update from Dr. Lassiter and IR team: IR team has reviewed patient's recent CT imaging, and are stating it is not safe to biopsy at this point given location.  They are recommending repeating CT in 1 month to reassess (as early as 11/30/18).  Telephone call was placed to patient with this update, recommendations, and plan.  Patient verbalizes understanding, and is in agreement with this plan.  Patient states that any day of the week would work for him to have the repeat CT scan, but he would prefer an appointment as late in the day as possible, and would like to have this done here in [place].  Scheduling team tasked to schedule repeat CT scan for 11/30/18 or later, and to call patient with date/time once scheduled.    Carlos Hadley, RN, BSN, OCN  Oncology Care Coordinator  [facility]

## 2018-11-19 NOTE — MR AVS SNAPSHOT
After Visit Summary   11/19/2018    Toribio Miller    MRN: [id number]           Patient Information     Date Of Birth          1980        Visit Information        Provider Department      11/19/2018 12:45 PM Jamal Lassiter MD [facility]        Today's Diagnoses     Large granular lymphocytosis    -  1    LA (lymphadenopathy)           Follow-ups after your visit        Your next 10 appointments already scheduled     Dec 04, 2018  2:45 PM CST   CT ABDOMEN PELVIS W CONTRAST with MGCT1   [facility]    [address] [place] [id number] [phone]           How do I prepare for my exam? (Food and drink instructions) To prepare: Do not eat or drink for 2 hours before your exam. If you need to take medicine, you may take it with small sips of water. (We may ask you to take liquid medicine as well.)  How do I prepare for my exam? (Other instructions) Please arrive 30 minutes early for your CT.  Once in the department you might be asked to drink water 15-20 minutes prior to your exam.  If indicated you may be asked to drink an oral contrast in advance of your CT.  If this is the case, the imaging team will let you know or be in contact with you prior to your appointment  Patients over 70 or patients with diabetes or kidney problems: If you haven t had a blood test (creatinine test) within the last 30 days, the Cardiologist/Radiologist may require you to get this test prior to your exam.  If you have diabetes:  Continue to take your metformin medication on the day of your exam  What should I wear: Please wear loose clothing, such as a sweat suit or jogging clothes. Avoid snaps, zippers and other metal. We may ask you to undress and put on a hospital gown.  How long does the exam take: Most scans take less than 20 minutes.  What should I bring: Please bring any scans or X-rays taken at other hospitals, if similar tests were  done. Also bring a list of your medicines, including vitamins, minerals and over-the-counter drugs. It is safest to leave personal items at home.  Do I need a : No  is needed.  What do I need to tell my doctor? Be sure to tell your doctor: * If you have any allergies. * If there s any chance you are pregnant. * If you are breastfeeding.  What should I do after the exam: No restrictions, You may resume normal activities.  What is this test: A CT (computed tomography) scan is a series of pictures that allows us to look inside your body. The scanner creates images of the body in cross sections, much like slices of bread. This helps us see any problems more clearly. You may receive contrast (X-ray dye) before or during your scan. You will be asked to drink the contrast.  Who should I call with questions: If you have any questions, please call the Imaging Department where you will have your exam. Directions, parking instructions, and other information is available on our website, [email or web address].            Dec 10, 2018  3:15 PM CST   Return Visit with Jamal Lassiter MD   [facility] ([facility]    [address] [place] [id number] [phone]              Future tests that were ordered for you today     Open Future Orders        Priority Expected Expires Ordered    CT Abdomen Pelvis w Contrast Routine  11/19/2019 11/19/2018    CT Lymph Node Biopsy Routine  11/19/2019 11/19/2018            Who to contact     If you have questions or need follow up information about today's clinic visit or your schedule please contact [facility] directly at [phone].  Normal or non-critical lab and imaging results will be communicated to you by MyChart, letter or phone within 4 business days after the clinic has received the results. If you do not hear from us within 7 days, please contact the clinic through MyChart or phone. If you have a critical  "or abnormal lab result, we will notify you by phone as soon as possible.  Submit refill requests through [employer] or call your pharmacy and they will forward the refill request to us. Please allow 3 business days for your refill to be completed.          Additional Information About Your Visit        [employer] Information     [employer] gives you secure access to your electronic health record. If you see a primary care provider, you can also send messages to your care team and make appointments. If you have questions, please call your primary care clinic.  If you do not have a primary care provider, please call [phone] and they will assist you.      [employer] is an electronic gateway that provides easy, online access to your medical records. With [employer], you can request a clinic appointment, read your test results, renew a prescription or communicate with your care team.     To access your existing account, please contact your [facility] or call [phone] for assistance.        Care EveryWhere ID     This is your Care EveryWhere ID. This could be used by other organizations to access your [facility] medical records  [id number]        Your Vitals Were     Pulse Temperature Respirations Height Pulse Oximetry BMI (Body Mass Index)    93 97.8  F (36.6  C) (Oral) 16 1.791 m (5' 10.5\") 99% 23.42 kg/m2       Blood Pressure from Last 3 Encounters:   11/19/18 (!) 155/92   11/07/18 126/74   11/02/18 134/82    Weight from Last 3 Encounters:   11/19/18 75.1 kg (165 lb 9 oz)   11/02/18 74.8 kg (165 lb)   10/30/18 77.7 kg (171 lb 3.2 oz)              We Performed the Following     HIV Antigen Antibody Combo     Lactate Dehydrogenase     Leukemia Lymphoma Evaluation (Flow Cytometry)        Primary Care Provider Office Phone # Fax #    Weston Coelho [phone] [address]  [facility] [address]        Equal Access to Services     HAMZAH TRAMMELL AH: Zabrina badillo " Nickie, waerastoda luvivadaha, qaswathita kakathleen russo, willie sarmiento juanjohn lajamesyvette meghan. So [facility] [phone].    ATENCIÓN: Si debra nicole, tiene a rangel disposición servicios gratuitos de asistencia lingüística. Lonnie al [phone].    We comply with applicable federal civil rights laws and [place] laws. We do not discriminate on the basis of race, color, national origin, age, disability, sex, sexual orientation, or gender identity.            Thank you!     Thank you for choosing [facility]  for your care. Our goal is always to provide you with excellent care. Hearing back from our patients is one way we can continue to improve our services. Please take a few minutes to complete the written survey that you may receive in the mail after your visit with us. Thank you!             Your Updated Medication List - Protect others around you: Learn how to safely use, store and throw away your medicines at www.disposemymeds.org.          This list is accurate as of 11/19/18 11:59 PM.  Always use your most recent med list.                   Brand Name Dispense Instructions for use Diagnosis    HYDROcodone-acetaminophen 5-325 MG per tablet    NORCO    30 tablet    Take 1 tablet by mouth every 4 hours as needed for pain    Lymphoproliferative disease (H)       hydrocortisone 2.5 % cream    ANUSOL-HC    30 g    Place rectally 2 times daily as needed for hemorrhoids    Anal fissure       naproxen 500 MG tablet    NAPROSYN          TYLENOL PO      Take 650 mg by mouth every 8 hours as needed for mild pain or fever

## 2018-11-19 NOTE — PROGRESS NOTES
DATE OF VISIT: Nov 19, 2018    REASON FOR REFERRAL:  large granular lymphocytes  Intra abdominal lymphadenopathy      HISTORY OF PRESENT ILLNESS:     38 year old Male who about a month ago developed symptoms of lower back pain with radiation down the right leg. Symptoms were consistent with prior sciatica and he was taking Tylenol as needed. He subsequently developed daily fevers for about 3 weeks associated night sweats. He was seen by his primary care provider and workup was ordered including labs and CT scans which revealed lymphocytosis with evidence of intra-abdominal/mesenteric lymphadenopathy. he was also noted to have an anal fissure for which he was referred to the syndrome as he     11/07/18 underwent bone marrow aspiration and biopsy which showed normal cellular marrow with cellularity is 60-70% with no morphological evidence of lymphoma, leukemia, plasma cell neoplasm or dysplasia. Peripheral blood however showing increased  large granular lymphocytes reactive versus clonal.    Presents to the medical oncology clinic today to establish care. He is accompanied by his wife and sister-in-law. States that symptoms of fever and night sweats have resolved completely. Anal fissure has also completely ruled out.  His only complaint currently is bilateral leg soreness towards the end of the day. Denies unintentional weight loss, worsening fatigue, diarrhea, nausea/vomiting, history of recurrent infections, sick contacts, abdominal pain, dyspnea, cough, skin infections or any other complaints         REVIEW OF SYSTEMS:      ROS: 14 point ROS neg other than the symptoms noted above in the HPI.    PAST MEDICAL HISTORY:   No past medical history on file.    PAST SURGICAL HISTORY:   Past Surgical History:   Procedure Laterality Date     ORTHOPEDIC SURGERY  10yrs ago per patient    left ankle        ALLERGIES:   Allergies as of 11/19/2018     (No Known Allergies)       MEDICATIONS:   Current Outpatient Prescriptions  "  Medication Sig Dispense Refill     Acetaminophen (TYLENOL PO) Take 650 mg by mouth every 8 hours as needed for mild pain or fever       HYDROcodone-acetaminophen (NORCO) 5-325 MG per tablet Take 1 tablet by mouth every 4 hours as needed for pain (Patient not taking: Reported on 11/7/2018) 30 tablet 0     hydrocortisone (ANUSOL-HC) 2.5 % cream Place rectally 2 times daily as needed for hemorrhoids (Patient not taking: Reported on 11/19/2018) 30 g 0     naproxen (NAPROSYN) 500 MG tablet           FAMILY HISTORY:   No family history on file.    SOCIAL HISTORY:   Social History     Social History     Marital status:      Spouse name: N/A     Number of children: N/A     Years of education: N/A     Social History Main Topics     Smoking status: Former Smoker     Smokeless tobacco: Current User     Types: Chew     Alcohol use Yes     Drug use: No     Sexual activity: Yes     Partners: Female     Other Topics Concern     None     Social History Narrative       PHYSICAL EXAMINATION:   BP (!) 155/92 (BP Location: Left arm)  Pulse 93  Temp 97.8  F (36.6  C) (Oral)  Resp 16  Ht 1.791 m (5' 10.5\")  Wt 75.1 kg (165 lb 9 oz)  SpO2 99%  BMI 23.42 kg/m2  Wt Readings from Last 10 Encounters:   11/19/18 75.1 kg (165 lb 9 oz)   11/02/18 74.8 kg (165 lb)   10/30/18 77.7 kg (171 lb 3.2 oz)   10/26/18 78 kg (172 lb)   11/07/17 78 kg (172 lb)   02/02/17 78.9 kg (174 lb)   01/13/15 78.9 kg (174 lb)      ECOG performance status: 0  Exam:  Constitutional: healthy, alert and no distress  Head: Normocephalic.   Neck: Neck supple. No adenopathy.  ENT: ENT exam normal, no neck nodes or sinus tenderness  Cardiovascular: n RRR.   Respiratory: negative\". Lungs clear  Gastrointestinal: Abdomen soft, non-tender. BS normal. No masses, organomegaly  Musculoskeletal: extremities normal- no gross deformities noted  Skin: no suspicious lesions or rashes  Neurologic: Gait normal. Sensation grossly WNL.  Psychiatric: mentation appears normal " and affect normal/bright  Hematologic/Lymphatic/Immunologic: Normal cervical lymph nodes        LABORATORY RESULTS:    Recent Labs   Lab Test  10/26/18   1450   NA  137   POTASSIUM  3.7   CHLORIDE  103   BUN  15   CR  0.76   GLC  110*   ALE  8.6     Recent Labs   Lab Test  11/07/18   0908  10/30/18   1505  10/26/18   1450   WBC  9.3  8.4  7.2   HGB  14.8  12.8*  13.3   PLT  344  316  275   MCV  89  88  88   NEUTROPHIL  36.0  27.0  30.0     Recent Labs   Lab Test  11/19/18   1403  10/30/18   1505  10/26/18   1450   BILITOTAL   --   0.5  0.5   ALKPHOS   --   93  86   ALT   --   248*  159*   AST   --   178*  105*   ALBUMIN   --   3.5  3.7   LDH  161   --    --      TEST(S):   Unilateral Bone Marrow Biopsy/Aspiration     FINAL DIAGNOSIS:   Bone marrow, posterior iliac crest, left decalcified trephine biopsy and   touch imprint; left direct aspirate   smear, and concentrated aspirate smear; and peripheral blood smear:     - Bone marrow cellularity of 60-70% with trilineage hematopoiesis, and   no increase in blasts     - No morphologic evidence of lymphoma, leukemia, plasma cell neoplasm,   or dysplasia     - Peripheral blood showing normal hemogram and differential with   increased large granular lymphocytes     - See comment     COMMENT:   Although the current white cell count is normal with a normal leukocyte   differential there are increased large   granular lymphocytes which account for 45% of leukocytes (100 cell manual   differential). This could be a   normal reactive expansion of a normal lymphocyte subset, but the   possibility of a clonal proliferation cannot   be excluded. The normal neutrophil count is reassuring as well as the lack    of CD8-positive sinusoidal   lymphocytes within the marrow, both which are features which can be   present in LGL-leukemia. Nevertheless, if   there is concern for the possibility of LGL-leukemia a peripheral blood   sample can be submitted for flow   cytometry Monday through  Thursday for the possibility of V beta analysis.   Overall, there is no morphologic evidence of leukemia, lymphoma or   dysplasia. Biopsy of the largest attainable   pelvic lymph node may be helpful if there is concern for lymphoma.   Correlation with viral, microbiology, and   serology studies, including HIV, is recommended.     Concurrent flow ([id number]) shows polytypic B cells, no aberrant   immunophenotype on T cells, no increase in   myeloid blasts, and polytypic CD56+ plasma cells.    IMAGING RESULTS:  Recent Results (from the past 744 hour(s))   XR Chest 2 Views    Narrative    Exam: Chest x-ray, 2 views, 10 30,018.    COMPARISON: No similar study.    HISTORY: Left-sided chest pain. Fever of unknown origin.    FINDINGS: PA and lateral views of the chest were obtained. No acute  airspace opacities. The pulmonary vasculature is distinct.  Cardiomediastinal silhouette within normal limits. No pleural  effusions. No pneumothorax.      Impression    IMPRESSION: No acute airspace opacities.    SERGE WALTON MD   CT Chest w/o Contrast    Narrative    Examination: CT CHEST W/O CONTRAST, 10/31/2018 8:03 AM     History: ; Fever of unknown origin; Abnormal LFTs    Comparison: 10/30/2018 chest radiograph    TECHNIQUE: Helical acquisition of CT images from the lung apices to  the kidneys without IV contrast. Coronal and axial MIP images were  reconstructed from the source data.    FINDINGS:     Lungs:  No pleural effusion or pneumothorax. No focal consolidation. 5 mm  nodule along the right minor fissure (series 6, image 160). Calcified  granuloma in the left upper lobe. The central tracheobronchial tree is  clear. No bronchial wall thickening or bronchiectasis.    Chest:   Normal heart size. No pericardial effusion. Normal caliber ascending  aorta and main pulmonary artery. No thoracic lymphadenopathy.    Upper abdomen: Limited evaluation of the upper abdomen. The spleen is  incompletely imaged but likely mildly  enlarged.    Bones: No acute or aggressive osseus abnormality.      Impression    IMPRESSION:   1. No acute airspace disease.  2. 5 mm nodule along the right minor fissure, likely a fissural lymph  node. An optional follow-up chest CT in 12 months can be obtained if  the patient is high risk for lung cancer.  3. The spleen is incompletely imaged but likely mildly enlarged.    ZIYAD JACOBSEN, DO   CT Abdomen Pelvis w Contrast    Narrative    Exam:  CT Abdomen and Pelvis with IV contrast.    Indication:  ; Fever of unknown origin; Abnormal LFTs     Comparison: CT chest 10/31/2018.    Technique: CT of abdomen and pelvis was acquired from top of diaphragm  to pubic symphysis with IV and oral contrast. Images were  reconstructed in axial and coronal sections. Images were reviewed in  lung, soft tissue, liver, bone windows.    Findings:   The lung bases are clear.    The liver, gallbladder, pancreas, are unremarkable. The spleen is  prominent. Adrenal glands and kidneys are unremarkable. Portal vein,  celiac axis, SMA, bilateral renal arteries are patent. No evidence of  bowel obstruction. Small bowel is mildly thickened at terminal ileum  without significant fat stranding. There is mesenteric lymphadenopathy  series 2 image 36 largest of which measures 1.9 cm. Numerous other  intra-abdominal lymph nodes. Lymph nodes are particularly prominent  about the cecum the largest of which measures 2 cm.       Impression    Impression: Abnormal abdominal lymphadenopathy in the mesentery and  about the cecum. Differential includes inflammatory, infectious, and  malignant conditions.    ELIESER SKY MD       ASSESSMENT AND PLAN:    38-year-old male with no significant past medical history was referred to hematology clinic today for further evaluation and recommendations on presence of large granular lymphocytes as well as intra-abdominal lymphadenopathy on workup for symptoms of unexplained fever and night sweats.    Large  granular lymphocytosis  (primary encounter diagnosis)  patient was informed that the differential diagnoses include reactive versus LGL leukemia. Although given normal neutrophil count and absence of CD8 positive lymphocytes goes against it. Completely rule out monoclonality, we'll order a flow cytometry v beta analysis       intra-abdominal lymphadenopathy  Differential includes reactive versus secondary to underlying lymphoma. Patient currently does not have presence of B symptoms currently with complete resolution of fever and night sweats.   We'll check serum LDH, HIV serology today   Patient's case was reviewed by interventional radiology today  evaluated for CT guided biopsy and given the location of the lymph nodes, itwas determined not safe  to proceed with  it at this point We will obtain a repeat CT scan in 1 month for reassessment and proceed with biopsy at that point if indicated      Return to clinic in 4 weeks to review the results of the tests      The patient is ready to learn, no apparent learning barriers were identified, Diagnosis and treatment plans were explained to the patient. The patient expressed understanding of the content. The patient questions were answered to his satisfaction.    Chart documentation with Dragon Voice recognition Software. Although reviewed after completion, some words and grammatical errors may remain.    Jamal Lassiter MD  Attending Physician   Hematology/Medical Oncology

## 2018-11-19 NOTE — Clinical Note
11/19/2018         RE: Toribio Miller  [address]  [place] [address]        Dear Colleague,    Thank you for referring your patient, Toribio Miller, to the [facility]. Please see a copy of my visit note below.    DATE OF VISIT: Nov 19, 2018    REASON FOR REFERRAL:  large granular lymphocytes  Intra abdominal lymphadenopathy      HISTORY OF PRESENT ILLNESS:     38 year old Male who about a month ago developed symptoms of lower back pain with radiation down the right leg. Symptoms were consistent with prior sciatica and he was taking Tylenol as needed. He subsequently developed daily fevers for about 3 weeks associated night sweats. He was seen by his primary care provider and workup was ordered including labs and CT scans which revealed lymphocytosis with evidence of intra-abdominal/mesenteric lymphadenopathy. he was also noted to have an anal fissure for which he was referred to the syndrome as he     11/07/18 underwent bone marrow aspiration and biopsy which showed normal cellular marrow with cellularity is 60-70% with no morphological evidence of lymphoma, leukemia, plasma cell neoplasm or dysplasia. Peripheral blood however showing increased  large granular lymphocytes reactive versus clonal.    Presents to the medical oncology clinic today to establish care. He is accompanied by his wife and sister-in-law. States that symptoms of fever and night sweats have resolved completely. Anal fissure has also completely ruled out.  His only complaint currently is bilateral leg soreness towards the end of the day. Denies unintentional weight loss, worsening fatigue, diarrhea, nausea/vomiting, history of recurrent infections, sick contacts, abdominal pain, dyspnea, cough, skin infections or any other complaints         REVIEW OF SYSTEMS:      ROS: 14 point ROS neg other than the symptoms noted above in the HPI.    PAST MEDICAL HISTORY:   No past medical history on file.    PAST SURGICAL HISTORY:  "  Past Surgical History:   Procedure Laterality Date     ORTHOPEDIC SURGERY  10yrs ago per patient    left ankle        ALLERGIES:   Allergies as of 11/19/2018     (No Known Allergies)       MEDICATIONS:   Current Outpatient Prescriptions   Medication Sig Dispense Refill     Acetaminophen (TYLENOL PO) Take 650 mg by mouth every 8 hours as needed for mild pain or fever       HYDROcodone-acetaminophen (NORCO) 5-325 MG per tablet Take 1 tablet by mouth every 4 hours as needed for pain (Patient not taking: Reported on 11/7/2018) 30 tablet 0     hydrocortisone (ANUSOL-HC) 2.5 % cream Place rectally 2 times daily as needed for hemorrhoids (Patient not taking: Reported on 11/19/2018) 30 g 0     naproxen (NAPROSYN) 500 MG tablet           FAMILY HISTORY:   No family history on file.    SOCIAL HISTORY:   Social History     Social History     Marital status:      Spouse name: N/A     Number of children: N/A     Years of education: N/A     Social History Main Topics     Smoking status: Former Smoker     Smokeless tobacco: Current User     Types: Chew     Alcohol use Yes     Drug use: No     Sexual activity: Yes     Partners: Female     Other Topics Concern     None     Social History Narrative       PHYSICAL EXAMINATION:   BP (!) 155/92 (BP Location: Left arm)  Pulse 93  Temp 97.8  F (36.6  C) (Oral)  Resp 16  Ht 1.791 m (5' 10.5\")  Wt 75.1 kg (165 lb 9 oz)  SpO2 99%  BMI 23.42 kg/m2  Wt Readings from Last 10 Encounters:   11/19/18 75.1 kg (165 lb 9 oz)   11/02/18 74.8 kg (165 lb)   10/30/18 77.7 kg (171 lb 3.2 oz)   10/26/18 78 kg (172 lb)   11/07/17 78 kg (172 lb)   02/02/17 78.9 kg (174 lb)   01/13/15 78.9 kg (174 lb)      ECOG performance status: 0  Exam:  Constitutional: healthy, alert and no distress  Head: Normocephalic.   Neck: Neck supple. No adenopathy.  ENT: ENT exam normal, no neck nodes or sinus tenderness  Cardiovascular: n RRR.   Respiratory: negative\". Lungs clear  Gastrointestinal: Abdomen soft, " non-tender. BS normal. No masses, organomegaly  Musculoskeletal: extremities normal- no gross deformities noted  Skin: no suspicious lesions or rashes  Neurologic: Gait normal. Sensation grossly WNL.  Psychiatric: mentation appears normal and affect normal/bright  Hematologic/Lymphatic/Immunologic: Normal cervical lymph nodes        LABORATORY RESULTS:    Recent Labs   Lab Test  10/26/18   1450   NA  137   POTASSIUM  3.7   CHLORIDE  103   BUN  15   CR  0.76   GLC  110*   ALE  8.6     Recent Labs   Lab Test  11/07/18   0908  10/30/18   1505  10/26/18   1450   WBC  9.3  8.4  7.2   HGB  14.8  12.8*  13.3   PLT  344  316  275   MCV  89  88  88   NEUTROPHIL  36.0  27.0  30.0     Recent Labs   Lab Test  11/19/18   1403  10/30/18   1505  10/26/18   1450   BILITOTAL   --   0.5  0.5   ALKPHOS   --   93  86   ALT   --   248*  159*   AST   --   178*  105*   ALBUMIN   --   3.5  3.7   LDH  161   --    --      TEST(S):   Unilateral Bone Marrow Biopsy/Aspiration     FINAL DIAGNOSIS:   Bone marrow, posterior iliac crest, left decalcified trephine biopsy and   touch imprint; left direct aspirate   smear, and concentrated aspirate smear; and peripheral blood smear:     - Bone marrow cellularity of 60-70% with trilineage hematopoiesis, and   no increase in blasts     - No morphologic evidence of lymphoma, leukemia, plasma cell neoplasm,   or dysplasia     - Peripheral blood showing normal hemogram and differential with   increased large granular lymphocytes     - See comment     COMMENT:   Although the current white cell count is normal with a normal leukocyte   differential there are increased large   granular lymphocytes which account for 45% of leukocytes (100 cell manual   differential). This could be a   normal reactive expansion of a normal lymphocyte subset, but the   possibility of a clonal proliferation cannot   be excluded. The normal neutrophil count is reassuring as well as the lack    of CD8-positive sinusoidal    lymphocytes within the marrow, both which are features which can be   present in LGL-leukemia. Nevertheless, if   there is concern for the possibility of LGL-leukemia a peripheral blood   sample can be submitted for flow   cytometry Monday through Thursday for the possibility of V beta analysis.   Overall, there is no morphologic evidence of leukemia, lymphoma or   dysplasia. Biopsy of the largest attainable   pelvic lymph node may be helpful if there is concern for lymphoma.   Correlation with viral, microbiology, and   serology studies, including HIV, is recommended.     Concurrent flow ([id number]) shows polytypic B cells, no aberrant   immunophenotype on T cells, no increase in   myeloid blasts, and polytypic CD56+ plasma cells.    IMAGING RESULTS:  Recent Results (from the past 744 hour(s))   XR Chest 2 Views    Narrative    Exam: Chest x-ray, 2 views, 10 30,018.    COMPARISON: No similar study.    HISTORY: Left-sided chest pain. Fever of unknown origin.    FINDINGS: PA and lateral views of the chest were obtained. No acute  airspace opacities. The pulmonary vasculature is distinct.  Cardiomediastinal silhouette within normal limits. No pleural  effusions. No pneumothorax.      Impression    IMPRESSION: No acute airspace opacities.    SERGE WALTON MD   CT Chest w/o Contrast    Narrative    Examination: CT CHEST W/O CONTRAST, 10/31/2018 8:03 AM     History: ; Fever of unknown origin; Abnormal LFTs    Comparison: 10/30/2018 chest radiograph    TECHNIQUE: Helical acquisition of CT images from the lung apices to  the kidneys without IV contrast. Coronal and axial MIP images were  reconstructed from the source data.    FINDINGS:     Lungs:  No pleural effusion or pneumothorax. No focal consolidation. 5 mm  nodule along the right minor fissure (series 6, image 160). Calcified  granuloma in the left upper lobe. The central tracheobronchial tree is  clear. No bronchial wall thickening or  bronchiectasis.    Chest:   Normal heart size. No pericardial effusion. Normal caliber ascending  aorta and main pulmonary artery. No thoracic lymphadenopathy.    Upper abdomen: Limited evaluation of the upper abdomen. The spleen is  incompletely imaged but likely mildly enlarged.    Bones: No acute or aggressive osseus abnormality.      Impression    IMPRESSION:   1. No acute airspace disease.  2. 5 mm nodule along the right minor fissure, likely a fissural lymph  node. An optional follow-up chest CT in 12 months can be obtained if  the patient is high risk for lung cancer.  3. The spleen is incompletely imaged but likely mildly enlarged.    ZIYAD JACOBSEN, DO   CT Abdomen Pelvis w Contrast    Narrative    Exam:  CT Abdomen and Pelvis with IV contrast.    Indication:  ; Fever of unknown origin; Abnormal LFTs     Comparison: CT chest 10/31/2018.    Technique: CT of abdomen and pelvis was acquired from top of diaphragm  to pubic symphysis with IV and oral contrast. Images were  reconstructed in axial and coronal sections. Images were reviewed in  lung, soft tissue, liver, bone windows.    Findings:   The lung bases are clear.    The liver, gallbladder, pancreas, are unremarkable. The spleen is  prominent. Adrenal glands and kidneys are unremarkable. Portal vein,  celiac axis, SMA, bilateral renal arteries are patent. No evidence of  bowel obstruction. Small bowel is mildly thickened at terminal ileum  without significant fat stranding. There is mesenteric lymphadenopathy  series 2 image 36 largest of which measures 1.9 cm. Numerous other  intra-abdominal lymph nodes. Lymph nodes are particularly prominent  about the cecum the largest of which measures 2 cm.       Impression    Impression: Abnormal abdominal lymphadenopathy in the mesentery and  about the cecum. Differential includes inflammatory, infectious, and  malignant conditions.    ELIESER SKY MD       ASSESSMENT AND PLAN:    38-year-old male with no  significant past medical history was referred to hematology clinic today for further evaluation and recommendations on presence of large granular lymphocytes as well as intra-abdominal lymphadenopathy on workup for symptoms of unexplained fever and night sweats.    Large granular lymphocytosis  (primary encounter diagnosis)  patient was informed that the differential diagnoses include reactive versus LGL leukemia. Although given normal neutrophil count and absence of CD8 positive lymphocytes goes against it. Completely rule out monoclonality, we'll order a flow cytometry v beta analysis       intra-abdominal lymphadenopathy  Differential includes reactive versus secondary to underlying lymphoma. Patient currently does not have presence of B symptoms currently with complete resolution of fever and night sweats.   We'll check serum LDH, HIV serology today   Patient's case was reviewed by interventional radiology today  evaluated for CT guided biopsy and given the location of the lymph nodes, itwas determined not safe  to proceed with  it at this point We will obtain a repeat CT scan in 1 month for reassessment and proceed with biopsy at that point if indicated      Return to clinic in 4 weeks to review the results of the tests      The patient is ready to learn, no apparent learning barriers were identified, Diagnosis and treatment plans were explained to the patient. The patient expressed understanding of the content. The patient questions were answered to his satisfaction.    Chart documentation with Dragon Voice recognition Software. Although reviewed after completion, some words and grammatical errors may remain.    Jamal Lassiter MD  Attending Physician   Hematology/Medical Oncology        Again, thank you for allowing me to participate in the care of your patient.        Sincerely,        Jamal Lassiter MD

## 2018-11-19 NOTE — NURSING NOTE
"Oncology Rooming Note    November 19, 2018 12:49 PM   Toribio Miller is a 38 year old male who presents for:    Chief Complaint   Patient presents with     Oncology Clinic Visit     New Patient     Initial Vitals: BP (!) 155/92 (BP Location: Left arm)  Pulse 93  Temp 97.8  F (36.6  C) (Oral)  Resp 16  Ht 1.791 m (5' 10.5\")  Wt 75.1 kg (165 lb 9 oz)  SpO2 99%  BMI 23.42 kg/m2 Estimated body mass index is 23.42 kg/(m^2) as calculated from the following:    Height as of this encounter: 1.791 m (5' 10.5\").    Weight as of this encounter: 75.1 kg (165 lb 9 oz). Body surface area is 1.93 meters squared.  No Pain (0) Comment: Data Unavailable   No LMP for male patient.  Allergies reviewed: Yes  Medications reviewed: Yes    Medications: Medication refills not needed today.  Pharmacy name entered into [facility]: [facility] [place], [place] - [address], [id number]      5 minutes for nursing intake (face to face time)     Amira Robertson LPN              "

## 2018-11-19 NOTE — PROGRESS NOTES
IR being consulted for biopsy in this patient with mesenteric lymphadenopathy. Review of recent CT 10/31/2018 with Dr. Colon shows no suitable window for biopsy at this time. There are enlarged nodes, but they are unsafe to biopsy. Recommend follow-up CT 1 month post (as soon as 11/30). If enlarged or new nodes, let us know and we can reassess.        Joshua Kay PA-C  Interventional Radiology  [phone]

## 2018-11-20 LAB — HIV 1+2 AB+HIV1 P24 AG SERPL QL IA: NONREACTIVE

## 2018-11-21 LAB — COPATH REPORT: NORMAL

## 2018-11-23 ENCOUNTER — TELEPHONE (OUTPATIENT)
Dept: PEDIATRICS | Facility: CLINIC | Age: 38
End: 2018-11-23

## 2018-11-23 NOTE — TELEPHONE ENCOUNTER
I called the patient today and reviewed the bone marrow biopsy report with him.  It was reviewed  by Dr. Lassiter as well.  Patient is clinically feeling better.  Night sweats have resolved.  He still has back pain and indicated he might set up an appointment for me to assess that.  He thinks he probably should have an MRI.  The oncology service has scheduled a follow-up with a repeat CT scan in 1 month.

## 2018-12-04 ENCOUNTER — RADIANT APPOINTMENT (OUTPATIENT)
Dept: CT IMAGING | Facility: CLINIC | Age: 38
End: 2018-12-04
Attending: INTERNAL MEDICINE
Payer: COMMERCIAL

## 2018-12-04 DIAGNOSIS — R59.1 LA (LYMPHADENOPATHY): ICD-10-CM

## 2018-12-04 PROCEDURE — 74177 CT ABD & PELVIS W/CONTRAST: CPT | Performed by: RADIOLOGY

## 2018-12-04 RX ORDER — IOPAMIDOL 755 MG/ML
101 INJECTION, SOLUTION INTRAVASCULAR ONCE
Status: COMPLETED | OUTPATIENT
Start: 2018-12-04 | End: 2018-12-04

## 2018-12-04 RX ADMIN — IOPAMIDOL 101 ML: 755 INJECTION, SOLUTION INTRAVASCULAR at 14:48

## 2018-12-10 ENCOUNTER — ONCOLOGY VISIT (OUTPATIENT)
Dept: ONCOLOGY | Facility: CLINIC | Age: 38
End: 2018-12-10
Payer: COMMERCIAL

## 2018-12-10 VITALS
BODY MASS INDEX: 24.22 KG/M2 | DIASTOLIC BLOOD PRESSURE: 82 MMHG | HEART RATE: 76 BPM | HEIGHT: 71 IN | WEIGHT: 173 LBS | TEMPERATURE: 98.2 F | OXYGEN SATURATION: 100 % | SYSTOLIC BLOOD PRESSURE: 124 MMHG | RESPIRATION RATE: 18 BRPM

## 2018-12-10 DIAGNOSIS — R59.1 LYMPHADENOPATHY: Primary | ICD-10-CM

## 2018-12-10 PROCEDURE — 99214 OFFICE O/P EST MOD 30 MIN: CPT | Performed by: INTERNAL MEDICINE

## 2018-12-10 ASSESSMENT — PAIN SCALES - GENERAL: PAINLEVEL: NO PAIN (0)

## 2018-12-10 ASSESSMENT — MIFFLIN-ST. JEOR: SCORE: 1708.91

## 2018-12-10 NOTE — NURSING NOTE
"Oncology Rooming Note    December 10, 2018 3:24 PM   Toribio Miller is a 38 year old male who presents for:    Chief Complaint   Patient presents with     Oncology Clinic Visit     follow up CT 12/4/18     Initial Vitals: /82   Pulse 76   Temp 98.2  F (36.8  C)   Resp 18   Ht 1.791 m (5' 10.5\")   Wt 78.5 kg (173 lb)   SpO2 100%   BMI 24.47 kg/m   Estimated body mass index is 24.47 kg/m  as calculated from the following:    Height as of this encounter: 1.791 m (5' 10.5\").    Weight as of this encounter: 78.5 kg (173 lb). Body surface area is 1.98 meters squared.  No Pain (0) Comment: Data Unavailable   No LMP for male patient.  Allergies reviewed: Yes  Medications reviewed: Yes    Medications: Medication refills not needed today.  Pharmacy name entered into [employer]: [facility] [place], [place] - [address], [id number]       5 minutes for nursing intake (face to face time)     Angelina Dowell LPN              "

## 2018-12-10 NOTE — Clinical Note
"    12/10/2018         RE: Toribio Miller  [address]  [place] [address]        Dear Colleague,    Thank you for referring your patient, Toribio Miller, to the [facility]. Please see a copy of my visit note below.    DATE OF VISIT: Dec 10, 2018    REASON FOR REFERRAL:  large granular lymphocytes  Intra abdominal lymphadenopathy      HISTORY OF PRESENT ILLNESS:     38 year old Male who about a month ago developed symptoms of lower back pain with radiation down the right leg. Symptoms were consistent with prior sciatica and he was taking Tylenol as needed. He subsequently developed daily fevers for about 3 weeks associated night sweats. He was seen by his primary care provider and workup was ordered including labs and CT scans which revealed lymphocytosis with evidence of intra-abdominal/mesenteric lymphadenopathy.     11/07/18 underwent bone marrow aspiration and biopsy which showed normal cellular marrow with cellularity is 60-70% with no morphological evidence of lymphoma, leukemia, plasma cell neoplasm or dysplasia. Peripheral blood however showing increased  large granular lymphocytes reactive versus clonal.      11/19/18 Seen by hematology. Upper quarter for further evaluation of large clinical lymphocytes.Because of location, CT liver biopsy was not possible and recommendation was follow-up with a repeat CT scan in 1 month.    Interval history  Patient is here for 1 month follow-up. No new complaints. Denies abdominal pain, process chills, night sweats, weight loss or any other complaints. Good appetite and energy levels with stable weight    REVIEW OF SYSTEMS:      ROS: 14 point ROS neg other than the symptoms noted above in the HPI.      PHYSICAL EXAMINATION:   /82   Pulse 76   Temp 98.2  F (36.8  C)   Resp 18   Ht 1.791 m (5' 10.5\")   Wt 78.5 kg (173 lb)   SpO2 100%   BMI 24.47 kg/m     Wt Readings from Last 10 Encounters:   12/10/18 78.5 kg (173 lb)   11/19/18 75.1 kg " "(165 lb 9 oz)   11/02/18 74.8 kg (165 lb)   10/30/18 77.7 kg (171 lb 3.2 oz)   10/26/18 78 kg (172 lb)   11/07/17 78 kg (172 lb)   02/02/17 78.9 kg (174 lb)   01/13/15 78.9 kg (174 lb)      ECOG performance status: 0  Exam:  Constitutional: healthy, alert and no distress  Head: Normocephalic.   Neck: Neck supple. No adenopathy.  Cardiovascular: n RRR.   Respiratory: negative\". Lungs clear  Gastrointestinal: Abdomen soft, non-tender.   Neurologic: Gait normal. Sensation grossly WNL.  Psychiatric: mentation appears normal and affect normal/bright  Hematologic/Lymphatic/Immunologic: Normal cervical lymph nodes      LABORATORY RESULTS:    Lab Results   Component Value Date    WBC 9.3 11/07/2018     Lab Results   Component Value Date    RBC 5.06 11/07/2018     Lab Results   Component Value Date    HGB 14.8 11/07/2018     Lab Results   Component Value Date    HCT 44.8 11/07/2018     No components found for: MCT  Lab Results   Component Value Date    MCV 89 11/07/2018     Lab Results   Component Value Date    MCH 29.2 11/07/2018     Lab Results   Component Value Date    MCHC 33.0 11/07/2018     Lab Results   Component Value Date    RDW 13.6 11/07/2018     Lab Results   Component Value Date     11/07/2018     Component      Latest Ref Rng & Units 11/19/2018   Lactate Dehydrogenase      85 - 227 U/L 161   HIV Antigen Antibody Combo      NR:Nonreactive     Nonreactive       SPECIMEN(S):   Blood     INTERPRETATION:   Blood:          Polytypic B-cells        No aberrant immunophenotype on T-cells     COMMENT:   There is a population of CD3+ CD57+ T cells which could represent large   granular lymphocytes. This population   represents ~5% of T cells however, the absolute number cannot be reliably   calculated due to lack of   recent/concurrent CBC/WBC count. The significance of this T-LGLs   population is uncertain. Large granular   lymphocyte populations can be expanded in multiple reactive settings   including HIV " infection, other viral   infections, and after splenectomy, allogeneic stem cell transplantation,   and solid organ transplantation,   among others.   The finding of a decreased CD4:CD8 ratio is non-specific and etiologies     include neoplastic and non-neoplastic   causes (HIV infection , immune dysregulation, and other conditions).   Clinical correlation is required.         CT ABDOMEN PELVIS W CONTRAST 12/4/2018 2:56 PM      History: Lymphadenopathy     Comparison: 10/31/2018     Technique: CT of the abdomen and pelvis were obtained with contrast.  Sagittal and coronal reconstructions created and reviewed.     Findings:   Liver, pancreas, and adrenal glands are normal. Borderline  splenomegaly. Decompressed gallbladder.     3 mm nonobstructive stone in the inferior pole of the right kidney. No  left renal stones. No hydronephrosis bilaterally. Partially distended  urinary bladder. Pelvic phleboliths. Vas deferens calcifications.  Symmetric seminal vesicles. The prostate is not enlarged.     Decreased size of multiple mesenteric lymph nodes, for example on  series 3 image 69 there is an 8 mm lymph node which previously  measured 13 mm. On image 88 there is a 9 mm lymph node which  previously measured 14 mm. Near the cecum, there is a rounded 11 mm  lymph node which does not appear to be substantially changed. Multiple  other lymph nodes in the right lower quadrant are slightly less  prominent. No new lymphadenopathy in the abdomen or pelvis.     No bowel dilatation. Appendix is normal. No abnormal free peritoneal  fluid or air. Patent major abdominal vasculature. Calcified seminal  vesicles. The prostate is not enlarged. No abdominal aortic aneurysm.     Lower thorax: Unremarkable.     Bones and soft tissues: No acute or suspicious osseous abnormality.                                                                      Impression: Compared to 10/31/2018, there is decreased prominence of  indeterminate mesenteric  and right lower quadrant adenopathy. Ongoing  follow-up recommended.     I have personally reviewed the examination and initial interpretation  and I agree with the findings.     SERGE WALTON MD    ASSESSMENT AND PLAN:    38-year-old male with no significant past medical history was referred to hematology clinic today for further evaluation and recommendations on presence of large granular lymphocytes as well as intra-abdominal lymphadenopathy on workup for symptoms of unexplained fever and night sweats.    Large granular lymphocytosis  (primary encounter diagnosis)  Likely reactive as t-cell receptor flow cytometry showed no aberrant phenotype on T cells     Intra-abdominal lymphadenopathy  Likely  reactive as he does not have presence of B symptoms currently with Recent CT scan showing improvement in adenopathy   We'll defer lymphoma biopsy for now and repeat scans in 4 months for continued follow-up    Return to clinic in 4 months with CT scan prior. Return sooner if symptomatic      The patient is ready to learn, no apparent learning barriers were identified, Diagnosis and treatment plans were explained to the patient. The patient expressed understanding of the content. The patient questions were answered to his satisfaction.    Chart documentation with Dragon Voice recognition Software. Although reviewed after completion, some words and grammatical errors may remain.    Jamal Lassiter MD  Attending Physician   Hematology/Medical Oncology        Again, thank you for allowing me to participate in the care of your patient.        Sincerely,        Jamal Lassiter MD

## 2018-12-10 NOTE — PROGRESS NOTES
"DATE OF VISIT: Dec 10, 2018    REASON FOR REFERRAL:  large granular lymphocytes  Intra abdominal lymphadenopathy      HISTORY OF PRESENT ILLNESS:     38 year old Male who about a month ago developed symptoms of lower back pain with radiation down the right leg. Symptoms were consistent with prior sciatica and he was taking Tylenol as needed. He subsequently developed daily fevers for about 3 weeks associated night sweats. He was seen by his primary care provider and workup was ordered including labs and CT scans which revealed lymphocytosis with evidence of intra-abdominal/mesenteric lymphadenopathy.     11/07/18 underwent bone marrow aspiration and biopsy which showed normal cellular marrow with cellularity is 60-70% with no morphological evidence of lymphoma, leukemia, plasma cell neoplasm or dysplasia. Peripheral blood however showing increased  large granular lymphocytes reactive versus clonal.      11/19/18 Seen by hematology. Upper quarter for further evaluation of large clinical lymphocytes.Because of location, CT liver biopsy was not possible and recommendation was follow-up with a repeat CT scan in 1 month.    Interval history  Patient is here for 1 month follow-up. No new complaints. Denies abdominal pain, process chills, night sweats, weight loss or any other complaints. Good appetite and energy levels with stable weight    REVIEW OF SYSTEMS:      ROS: 14 point ROS neg other than the symptoms noted above in the HPI.      PHYSICAL EXAMINATION:   /82   Pulse 76   Temp 98.2  F (36.8  C)   Resp 18   Ht 1.791 m (5' 10.5\")   Wt 78.5 kg (173 lb)   SpO2 100%   BMI 24.47 kg/m    Wt Readings from Last 10 Encounters:   12/10/18 78.5 kg (173 lb)   11/19/18 75.1 kg (165 lb 9 oz)   11/02/18 74.8 kg (165 lb)   10/30/18 77.7 kg (171 lb 3.2 oz)   10/26/18 78 kg (172 lb)   11/07/17 78 kg (172 lb)   02/02/17 78.9 kg (174 lb)   01/13/15 78.9 kg (174 lb)      ECOG performance status: 0  Exam:  Constitutional: " "healthy, alert and no distress  Head: Normocephalic.   Neck: Neck supple. No adenopathy.  Cardiovascular: n RRR.   Respiratory: negative\". Lungs clear  Gastrointestinal: Abdomen soft, non-tender.   Neurologic: Gait normal. Sensation grossly WNL.  Psychiatric: mentation appears normal and affect normal/bright  Hematologic/Lymphatic/Immunologic: Normal cervical lymph nodes      LABORATORY RESULTS:    Lab Results   Component Value Date    WBC 9.3 11/07/2018     Lab Results   Component Value Date    RBC 5.06 11/07/2018     Lab Results   Component Value Date    HGB 14.8 11/07/2018     Lab Results   Component Value Date    HCT 44.8 11/07/2018     No components found for: MCT  Lab Results   Component Value Date    MCV 89 11/07/2018     Lab Results   Component Value Date    MCH 29.2 11/07/2018     Lab Results   Component Value Date    MCHC 33.0 11/07/2018     Lab Results   Component Value Date    RDW 13.6 11/07/2018     Lab Results   Component Value Date     11/07/2018     Component      Latest Ref Rng & Units 11/19/2018   Lactate Dehydrogenase      85 - 227 U/L 161   HIV Antigen Antibody Combo      NR:Nonreactive     Nonreactive       SPECIMEN(S):   Blood     INTERPRETATION:   Blood:          Polytypic B-cells        No aberrant immunophenotype on T-cells     COMMENT:   There is a population of CD3+ CD57+ T cells which could represent large   granular lymphocytes. This population   represents ~5% of T cells however, the absolute number cannot be reliably   calculated due to lack of   recent/concurrent CBC/WBC count. The significance of this T-LGLs   population is uncertain. Large granular   lymphocyte populations can be expanded in multiple reactive settings   including HIV infection, other viral   infections, and after splenectomy, allogeneic stem cell transplantation,   and solid organ transplantation,   among others.   The finding of a decreased CD4:CD8 ratio is non-specific and etiologies     include neoplastic " and non-neoplastic   causes (HIV infection , immune dysregulation, and other conditions).   Clinical correlation is required.         CT ABDOMEN PELVIS W CONTRAST 12/4/2018 2:56 PM      History: Lymphadenopathy     Comparison: 10/31/2018     Technique: CT of the abdomen and pelvis were obtained with contrast.  Sagittal and coronal reconstructions created and reviewed.     Findings:   Liver, pancreas, and adrenal glands are normal. Borderline  splenomegaly. Decompressed gallbladder.     3 mm nonobstructive stone in the inferior pole of the right kidney. No  left renal stones. No hydronephrosis bilaterally. Partially distended  urinary bladder. Pelvic phleboliths. Vas deferens calcifications.  Symmetric seminal vesicles. The prostate is not enlarged.     Decreased size of multiple mesenteric lymph nodes, for example on  series 3 image 69 there is an 8 mm lymph node which previously  measured 13 mm. On image 88 there is a 9 mm lymph node which  previously measured 14 mm. Near the cecum, there is a rounded 11 mm  lymph node which does not appear to be substantially changed. Multiple  other lymph nodes in the right lower quadrant are slightly less  prominent. No new lymphadenopathy in the abdomen or pelvis.     No bowel dilatation. Appendix is normal. No abnormal free peritoneal  fluid or air. Patent major abdominal vasculature. Calcified seminal  vesicles. The prostate is not enlarged. No abdominal aortic aneurysm.     Lower thorax: Unremarkable.     Bones and soft tissues: No acute or suspicious osseous abnormality.                                                                      Impression: Compared to 10/31/2018, there is decreased prominence of  indeterminate mesenteric and right lower quadrant adenopathy. Ongoing  follow-up recommended.     I have personally reviewed the examination and initial interpretation  and I agree with the findings.     SERGE WALTON MD    ASSESSMENT AND PLAN:    38-year-old male  with no significant past medical history was referred to hematology clinic today for further evaluation and recommendations on presence of large granular lymphocytes as well as intra-abdominal lymphadenopathy on workup for symptoms of unexplained fever and night sweats.    Large granular lymphocytosis  (primary encounter diagnosis)  Likely reactive as t-cell receptor flow cytometry showed no aberrant phenotype on T cells     Intra-abdominal lymphadenopathy  Likely  reactive as he does not have presence of B symptoms currently with Recent CT scan showing improvement in adenopathy   We'll defer lymphoma biopsy for now and repeat scans in 4 months for continued follow-up    Return to clinic in 4 months with CT scan prior. Return sooner if symptomatic      The patient is ready to learn, no apparent learning barriers were identified, Diagnosis and treatment plans were explained to the patient. The patient expressed understanding of the content. The patient questions were answered to his satisfaction.    Chart documentation with Dragon Voice recognition Software. Although reviewed after completion, some words and grammatical errors may remain.    Jamal Lassiter MD  Attending Physician   Hematology/Medical Oncology

## 2019-04-04 ENCOUNTER — ANCILLARY PROCEDURE (OUTPATIENT)
Dept: CT IMAGING | Facility: CLINIC | Age: 39
End: 2019-04-04
Attending: INTERNAL MEDICINE
Payer: COMMERCIAL

## 2019-04-04 DIAGNOSIS — R59.1 LYMPHADENOPATHY: ICD-10-CM

## 2019-04-04 PROCEDURE — 74177 CT ABD & PELVIS W/CONTRAST: CPT | Performed by: RADIOLOGY

## 2019-04-04 RX ORDER — IOPAMIDOL 755 MG/ML
105 INJECTION, SOLUTION INTRAVASCULAR ONCE
Status: COMPLETED | OUTPATIENT
Start: 2019-04-04 | End: 2019-04-04

## 2019-04-04 RX ADMIN — IOPAMIDOL 105 ML: 755 INJECTION, SOLUTION INTRAVASCULAR at 08:14

## 2019-04-05 ENCOUNTER — CARE COORDINATION (OUTPATIENT)
Dept: ONCOLOGY | Facility: CLINIC | Age: 39
End: 2019-04-05

## 2019-04-05 NOTE — PROGRESS NOTES
"Informed patient that Dr. Lassiter reviewed his most recent CT scan and noted, \"Intra abdominal adenopathy further improved on CT scan. Continue care with PCP and return to hematology clinic as needed.\" Patient expressed that he was pleased nothing concerning was found. Stated he did not have a PCP. Recommended that patient find a PCP for continuity of care. Winsome Henry RN       "

## 2020-02-16 ENCOUNTER — HEALTH MAINTENANCE LETTER (OUTPATIENT)
Age: 40
End: 2020-02-16

## 2020-02-26 ENCOUNTER — OFFICE VISIT (OUTPATIENT)
Dept: FAMILY MEDICINE | Facility: CLINIC | Age: 40
End: 2020-02-26
Payer: COMMERCIAL

## 2020-02-26 VITALS
BODY MASS INDEX: 25.22 KG/M2 | SYSTOLIC BLOOD PRESSURE: 137 MMHG | OXYGEN SATURATION: 98 % | RESPIRATION RATE: 14 BRPM | WEIGHT: 176.2 LBS | HEART RATE: 78 BPM | DIASTOLIC BLOOD PRESSURE: 92 MMHG | TEMPERATURE: 97.8 F | HEIGHT: 70 IN

## 2020-02-26 DIAGNOSIS — L05.91 PILONIDAL CYST: Primary | ICD-10-CM

## 2020-02-26 PROCEDURE — 87070 CULTURE OTHR SPECIMN AEROBIC: CPT | Performed by: FAMILY MEDICINE

## 2020-02-26 PROCEDURE — 10080 I&D PILONIDAL CYST SIMPLE: CPT | Performed by: FAMILY MEDICINE

## 2020-02-26 ASSESSMENT — MIFFLIN-ST. JEOR: SCORE: 1710.49

## 2020-02-26 ASSESSMENT — PAIN SCALES - GENERAL: PAINLEVEL: EXTREME PAIN (8)

## 2020-02-26 NOTE — PROGRESS NOTES
Subjective     Toribio Miller is a 39 year old male who presents to clinic today for the following health issues:    HPI   Concern - Cyst  Onset: Saturday    Description:   Has a bump  On his right buttocks. It hurts, having a hard time waking. Burning sensation when he has  BM.     Intensity: 8/10 at the worst.     Progression of Symptoms:  worsening    Accompanying Signs & Symptoms    Previous history of similar problem: fissures in the past.     Precipitating factors:   Worsened by: BM, urination, coughing, turning over in the middle of the night, walking.     Alleviating factors:  Improved by: Baths and tylenol     Therapies Tried and outcome: Epsom salt baths      Patient Active Problem List   Diagnosis     Lymphoproliferative disease (H)     Anal fissure     Past Surgical History:   Procedure Laterality Date     ORTHOPEDIC SURGERY  10yrs ago per patient    left ankle        Social History     Tobacco Use     Smoking status: Former Smoker     Smokeless tobacco: Current User     Types: Chew   Substance Use Topics     Alcohol use: Yes     History reviewed. No pertinent family history.      Current Outpatient Medications   Medication Sig Dispense Refill     Acetaminophen (TYLENOL PO) Take 650 mg by mouth every 8 hours as needed for mild pain or fever       naproxen (NAPROSYN) 500 MG tablet        HYDROcodone-acetaminophen (NORCO) 5-325 MG per tablet Take 1 tablet by mouth every 4 hours as needed for pain (Patient not taking: Reported on 2/26/2020) 30 tablet 0     hydrocortisone (ANUSOL-HC) 2.5 % cream Place rectally 2 times daily as needed for hemorrhoids (Patient not taking: Reported on 2/26/2020) 30 g 0     No Known Allergies  Recent Labs   Lab Test 10/30/18  1505 10/26/18  1450   * 159*   CR  --  0.76   GFRESTIMATED  --  >90   GFRESTBLACK  --  >90   POTASSIUM  --  3.7      BP Readings from Last 3 Encounters:   02/26/20 (!) 137/92   12/10/18 124/82   11/19/18 (!) 155/92    Wt Readings from Last 3  "Encounters:   02/26/20 79.9 kg (176 lb 3.2 oz)   12/10/18 78.5 kg (173 lb)   11/19/18 75.1 kg (165 lb 9 oz)                    Reviewed and updated as needed this visit by Provider         Review of Systems   ROS COMP: Constitutional, HEENT, cardiovascular, pulmonary, gi and gu systems are negative, except as otherwise noted.      Objective    BP (!) 137/92   Pulse 78   Temp 97.8  F (36.6  C) (Temporal)   Resp 14   Ht 1.778 m (5' 10\")   Wt 79.9 kg (176 lb 3.2 oz)   SpO2 98%   BMI 25.28 kg/m    Body mass index is 25.28 kg/m .  Physical Exam   GENERAL: healthy, alert and no distress  ABDOMEN: soft, nontender, no hepatosplenomegaly, no masses and bowel sounds normal  RECTAL (male): No hemorrhoids, right superior 2 cm tender cyst, mild redness  MS: no gross musculoskeletal defects noted, no edema  NEURO: Normal strength and tone, mentation intact and speech normal  PSYCH: mentation appears normal, affect normal/bright    Diagnostic Test Results:  Labs reviewed in Epic      Pilonidal cyst incision and drainage- procedure was explained to patient in detail, to include risks, written consent obtained.  Patient was placed in the prone position, area was cleansed with alcohol and anesthetized with 7 cc lidocaine 1% with epinephrine, area was then cleansed with Betadine and draped in the usual fashion.  A 1 cm incision was made using a 10 blade, approximately 20 cc of purulent fluid was expressed, culture was obtained and sent to the lab.  Area was irrigated with 40 cc normal saline.  Iodoform gauze was placed, approximately 2-1/2 inches.  Patient was scheduled for follow-up replacement of gauze tomorrow.  Tolerated procedure well, reduction in pain, discharged in stable condition with wound care and follow-up instructions.    ASSESSMENT and PLAN  1. Pilonidal cyst  39-year-old male with 4-day history of worsening perianal pain, on examination right sided pilonidal cyst.  Incision and drainage was conducted.  See " procedure note above.  Patient tolerated procedure well with good reduction in pain.  He will return tomorrow for gauze repacking.  Given wound care and follow-up instructions.  - Wound Culture Aerobic Bacterial  - DRAIN PILONIDAL CYST SIMPLE    Return in about 1 day (around 2/27/2020) for Follow Up from Today's Encounter.     Hermilo Stephen MD, FAAFP  Family Medicine Physician  [facility] [place]  [address] [place] [address]

## 2020-02-26 NOTE — PATIENT INSTRUCTIONS
Patient Education     Infected Pilonidal Cyst (Incision & Drainage)  A pilonidal cyst is a swelling that starts under the skin on the sacrum near the tail bone. It may look like a small dimple. It can fill with skin oils, hair, and dead skin cells. It may stay small or grow larger. Because it often has an opening to the surface, it may become infected with normal skin bacteria.  Cause  The cause of pilonidal cysts has been debated since they were first recognized. It may be present at birth and go unnoticed. Injury, rubbing, or skin irritation may also cause pilonidal cysts. It can also be caused by an ingrown hair. Most likely, the cause is a combination of these things. Because some injury or irritation can lead to pilonidal cysts, it can be more common in people who sit or drive a lot for work.  Symptoms  A pilonidal cyst may be small and painless. If it is inflamed or infected, you may have these symptoms:    Swelling    Irritation or redness    Pain    Drainage  The cyst can swell and drain on its own. The swelling and drainage can come and go.  Treatment  Your pilonidal cyst was drained with a small incision using local anesthesia.  After the incision and drainage, gauze packing may be inserted into the opening. If so, it should be removed in 1 to 2 days. Antibiotics are not required in the treatment of a simple abscess, unless the infection is spreading into the skin around the wound. The wound will take about 1 to 2 weeks to heal depending on the size of the cyst.  Home care  Wound care    Pus may drain from the wound for the first few days. Cover the wound with a clean dry bandage. Change the bandage if it becomes soaked with blood or pus, or if it gets soiled with feces or urine.    Sit in a tub filled with about 6 inches of hot water. Keep the water hot for 10 to15 minutes.    If gauze packing was placed inside the cyst cavity, you may be told to remove it yourself. You may do this in the shower. Once  the packing is removed, you should wash the area carefully in the shower once a day. Do this until the skin opening has closed. It is okay to direct the shower spray directly into the opening if this is not too painful.  Medicines    Take acetaminophen or ibuprofen for pain, unless you were given a different pain medicine to use. Talk with your doctor before using these medicines if you have chronic liver or kidney disease or have ever had a stomach ulcer or gastrointestinal bleeding. Also talk with your doctor if you are taking blood thinner medicines.    If you were given antibiotics, take them until they are gone. It is important to finish the antibiotics even if the wound looks better. This is to make sure the infection has cleared.    Use antibiotic cream or ointment if your healthcare provider tells you to do so.  Prevention  Once this infection has healed, the following may decrease the risk of future infections:    Keep the area of the cyst clean by bathing or showering daily.    Avoid tight-fitting clothing to minimize perspiration and irritation of the skin.    Recurrent pilonidal cysts may be completely removed by surgery. But this can only be done at a time when there is no infection. Ask your doctor for more information.  Follow-up care  Follow up with your healthcare provider, or as advised. If a gauze packing was inserted in your wound, it should be removed in 1 to 2 days. Check your wound every day for the signs listed below.  When to seek medical advice  Call your healthcare provider right away if any of these occur:    Pus continues to come from the cyst for 5 days after the incision    Increasing redness, local pain, or swelling    Fever of 100.4 F (38.0 C) or higher for more than 2 days, or as directed by your healthcare provider  Date Last Reviewed: 12/1/2016 2000-2019 [employer]. [address] [place] [address], [place] [address]. All rights reserved. This information is not intended  as a substitute for professional medical care. Always follow your healthcare professional's instructions.           Patient Education     Pilonidal Cyst    A pilonidal cyst is found near the base of the spine (tailbone) or top of the buttocks crease. It may look like a pit or small depression. In some cases, it may have a hollow tunnel (sinus tract) that connects it to the surface of the skin. Normally, a pilonidal cyst does not cause symptoms. But if it becomes infected, it can cause pain and swelling. This sheet tells you more about pilonidal cysts and how they are treated.  What causes a pilonidal cyst and who gets them?  Two main causes are:    Ingrown hairs. This happens when a hair is forced under the skin or when a hair follicle ruptures.    Injury to the area. This can happen from sitting for long periods of time.  These cysts are often diagnosed in people between ages 16 and 26. But people of any age can have a pilonidal cyst. They affect both men and women, but they are more common in men.  Symptoms of a pilonidal cyst infection  A pilonidal cyst may not cause symptoms unless it becomes infected or inflamed. Once a pilonidal cyst becomes infected, it is called a pilonidal abscess. Infection may cause the following symptoms:    Pain, redness, and swelling of the cyst and area around it    Foul-smelling drainage from the cyst    Fever  Diagnosing a pilonidal cyst  A pilonidal cyst can be diagnosed by how it looks and by its location. Your healthcare provider will examine the suspected cyst to confirm a diagnosis. You will be told if any tests are needed.  Treating a pilonidal cyst infection  Most pilonidal cysts are left alone. But if a cyst becomes infected or inflamed, treatment is needed. It may include the following:    Incision and drainage. If needed, the cyst is cut open, and pus and other infected material is allowed to drain.    Antibiotic medicines for the infection. Know that medicines do not make  the cyst go away, and antibiotics have limited use in treating an abscess. They also won t keep a cyst from becoming infected again.    Hot water soaks. These can help draw out the infection and ease pain and itching.    Surgery to remove the cyst (excision). This may be done if the infection is severe, does not respond to medicine, or keeps coming back. A surgeon cuts and removes the cyst and the tissue around it. Your healthcare provider can tell you more if this is needed.    Laser hair removalaround the area. This may decrease the frequency of flare-ups.  Preventing infection  A pilonidal cyst can easily become infected. Do the following to help prevent infections:    Keep the cyst and surrounding skin area clean.    Remove hair from the area of the cyst regularly. Ask your healthcare provider about safe hair removal products or procedures.    Avoid sitting in one position for long periods of time. This helps to reduce weight and pressure on your tailbone area. Sitting on a special cushion to relieve pressure on the tailbone may also help. Ask your healthcare provider about where to purchase these cushions.    Avoid tight-fitting clothing to reduce skin irritation around the cyst.  Date Last Reviewed: 2/1/2017 2000-2019 [employer]. [address] [place] [address]. All rights reserved. This information is not intended as a substitute for professional medical care. Always follow your healthcare professional's instructions.         Toribio,    It was great seeing you in clinic today.  I summarized our discussion and your plan below.  Please let me know if you have any questions or concerns.  Please follow up with me as discussed in clinic or sooner if any worsening or additional concerns.     1. Pilonidal cyst  39-year-old male with 4-day history of worsening perianal pain, on examination right sided pilonidal cyst.  Incision and drainage was conducted.  See procedure note above.  Patient  tolerated procedure well with good reduction in pain.  He will return tomorrow for gauze repacking.  Given wound care and follow-up instructions.  - Wound Culture Aerobic Bacterial  - DRAIN PILONIDAL CYST SIMPLE       Sincerely,  Dr. MEGAN Stephen MD, [facility]  Family Medicine Physician  [facility] [place]  [address] [place] [address]    Patient Education

## 2020-02-27 ENCOUNTER — ALLIED HEALTH/NURSE VISIT (OUTPATIENT)
Dept: FAMILY MEDICINE | Facility: CLINIC | Age: 40
End: 2020-02-27
Payer: COMMERCIAL

## 2020-02-27 DIAGNOSIS — Z48.00 CHANGE OR REMOVAL OF WOUND DRESSING: Primary | ICD-10-CM

## 2020-02-27 PROCEDURE — 99207 ZZC NO CHARGE NURSE ONLY: CPT

## 2020-02-28 ENCOUNTER — ALLIED HEALTH/NURSE VISIT (OUTPATIENT)
Dept: FAMILY MEDICINE | Facility: CLINIC | Age: 40
End: 2020-02-28
Payer: COMMERCIAL

## 2020-02-28 DIAGNOSIS — T14.8XXA OPEN WOUND: Primary | ICD-10-CM

## 2020-02-28 LAB
BACTERIA SPEC CULT: NO GROWTH
Lab: NORMAL
SPECIMEN SOURCE: NORMAL

## 2020-02-28 PROCEDURE — 99207 ZZC NO CHARGE NURSE ONLY: CPT

## 2020-02-28 NOTE — PROGRESS NOTES
Pt presents to clinic for packing of wound. The packing from yesterday had fallen out. The wound is still open and draining. Was able to insert 1cm of packing with 1 cm tail. Pt will return Monday    Carri Farnsworth, MSN, RN

## 2020-02-28 NOTE — PROGRESS NOTES
Pt presents to clinic for packing of wound. The packing from yesterday had fallen out. The wound is still open and draining. Was able to insert 2cm of packing with 1 cm tail. Pt will return tomorrow.    Carri Farnsworth, MSN, RN

## 2020-03-02 ENCOUNTER — OFFICE VISIT (OUTPATIENT)
Dept: FAMILY MEDICINE | Facility: CLINIC | Age: 40
End: 2020-03-02
Payer: COMMERCIAL

## 2020-03-02 DIAGNOSIS — T14.8XXA OPEN WOUND: Primary | ICD-10-CM

## 2020-03-02 PROCEDURE — 99207 ZZC NO BILLABLE SERVICE THIS VISIT: CPT | Performed by: FAMILY MEDICINE

## 2020-03-02 NOTE — PROGRESS NOTES
39-year-old male being followed for dressing changes for pilonidal cyst drainage, that occurred last week.  His pain has completely subsided, on exam very small incision, unable to pack additional gauze, incision appears clean dry and intact and healthy.  No additional gauze needed sign.  Continue to keep area clean.  Follow-up if any concerns.    Hermilo Stephen MD, FAAFP  Family Medicine Physician  [facility] [place]  [address] [facility], [place] [address]

## 2020-11-09 ENCOUNTER — OFFICE VISIT (OUTPATIENT)
Dept: FAMILY MEDICINE | Facility: CLINIC | Age: 40
End: 2020-11-09
Payer: COMMERCIAL

## 2020-11-09 VITALS
RESPIRATION RATE: 16 BRPM | SYSTOLIC BLOOD PRESSURE: 132 MMHG | DIASTOLIC BLOOD PRESSURE: 80 MMHG | WEIGHT: 186.5 LBS | BODY MASS INDEX: 26.7 KG/M2 | OXYGEN SATURATION: 96 % | HEART RATE: 91 BPM | HEIGHT: 70 IN | TEMPERATURE: 98.1 F
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
## 2020-11-09 DIAGNOSIS — D47.9 LYMPHOPROLIFERATIVE DISEASE (H): ICD-10-CM

## 2020-11-09 DIAGNOSIS — Z13.1 SCREENING FOR DIABETES MELLITUS: ICD-10-CM

## 2020-11-09 DIAGNOSIS — Z23 NEED FOR PROPHYLACTIC VACCINATION AND INOCULATION AGAINST INFLUENZA: ICD-10-CM

## 2020-11-09 DIAGNOSIS — Z13.220 LIPID SCREENING: ICD-10-CM

## 2020-11-09 LAB
ANION GAP SERPL CALCULATED.3IONS-SCNC: 4 MMOL/L (ref 3–14)
BUN SERPL-MCNC: 14 MG/DL (ref 7–30)
CALCIUM SERPL-MCNC: 9.2 MG/DL (ref 8.5–10.1)
CHLORIDE SERPL-SCNC: 104 MMOL/L (ref 94–109)
CHOLEST SERPL-MCNC: 173 MG/DL
CO2 SERPL-SCNC: 28 MMOL/L (ref 20–32)
CREAT SERPL-MCNC: 0.7 MG/DL (ref 0.66–1.25)
GFR SERPL CREATININE-BSD FRML MDRD: >90 ML/MIN/{1.73_M2}
GLUCOSE SERPL-MCNC: 103 MG/DL (ref 70–99)
HDLC SERPL-MCNC: 58 MG/DL
LDLC SERPL CALC-MCNC: 97 MG/DL
NONHDLC SERPL-MCNC: 115 MG/DL
POTASSIUM SERPL-SCNC: 4 MMOL/L (ref 3.4–5.3)
SODIUM SERPL-SCNC: 136 MMOL/L (ref 133–144)
TRIGL SERPL-MCNC: 90 MG/DL

## 2020-11-09 PROCEDURE — 99213 OFFICE O/P EST LOW 20 MIN: CPT | Mod: 25 | Performed by: FAMILY MEDICINE

## 2020-11-09 PROCEDURE — 36415 COLL VENOUS BLD VENIPUNCTURE: CPT | Performed by: FAMILY MEDICINE

## 2020-11-09 PROCEDURE — 80061 LIPID PANEL: CPT | Performed by: FAMILY MEDICINE

## 2020-11-09 PROCEDURE — 80048 BASIC METABOLIC PNL TOTAL CA: CPT | Performed by: FAMILY MEDICINE

## 2020-11-09 PROCEDURE — 90686 IIV4 VACC NO PRSV 0.5 ML IM: CPT | Performed by: FAMILY MEDICINE

## 2020-11-09 PROCEDURE — 90471 IMMUNIZATION ADMIN: CPT | Performed by: FAMILY MEDICINE

## 2020-11-09 RX ORDER — DOXYCYCLINE HYCLATE 100 MG
200 TABLET ORAL ONCE
Qty: 2 TABLET | Refills: 0 | Status: SHIPPED | OUTPATIENT
Start: 2020-11-09 | End: 2020-11-09

## 2020-11-09 ASSESSMENT — MIFFLIN-ST. JEOR: SCORE: 1752.21

## 2020-11-09 NOTE — PROGRESS NOTES
"Subjective     Toribio Miller is a 40 year old male who presents to clinic today for the following health issues:    HPI         Tick bite  Onset/Duration: Possible Friday night. Patient noticed a tick on his right arm from hunting.   Description  Location: Left arm.   Color: Red target.   Character: Red. No pain. No fevers. No body aches. No sweats. No chills.   Itching: no  Bleeding:  no  Intensity:  moderate  Progression of Symptoms:  worsening  Accompanying signs and symptoms:   Bleeding: no  Scaling: no     Therapies tried and outcome: Patient cleaned his bite with perioxde , soap, and water.     Patient states he went deer hunting this past weekend.  He did get 1 deer.  This is on Friday.  He returned on Sunday and found a tick in his right upper arm.  There is redness around the area.  He denies any fever, chills, body aches.  It is not pruritic.  He has no other questions or concerns.    He states he does not know what kind of tick bit him.  It was different than any he has previously seen.    He is okay with getting his flu shot as well as general screening for diabetes and cholesterol.    Review of Systems   Constitutional, HEENT, lymph, Derm, MSK, cardiovascular, pulmonary, gi and gu systems are negative, except as otherwise noted.      Objective    /80   Pulse 91   Temp 98.1  F (36.7  C) (Temporal)   Resp 16   Ht 1.778 m (5' 10\")   Wt 84.6 kg (186 lb 8 oz)   SpO2 96%   BMI 26.76 kg/m    Body mass index is 26.76 kg/m .  Physical Exam   General: Appears well and in no acute distress.  Respiratory: Unlabored breathing.  Musculoskeletal: No gross extremity deformities. No peripheral edema. Normal muscle bulk.   Derm: Approximate 1.5 inch diameter area of erythema located on medial right upper arm with central punctate.  No palpable induration, fluctuance, and no pus expressible.       Labs: Pending        Assessment & Plan   1. Tick bite, initial encounter: Likely attached for greater than 36 " hours over the duration of his hunting trip.  Unclear which species of tick bite came from.  Does have some surrounding erythema, but likely irritation from tick bite.  No evidence of expressible pus or induration concerning for abscess.  Will treat with prophylactic dose of doxycycline 200 mg once.  Discussed side effects including nausea, vomiting, diarrhea, photosensitivity.  Discussed not taking this with food to increase efficacy.  Patient agreeable with this plan.  - doxycycline hyclate (VIBRA-TABS) 100 MG tablet; Take 2 tablets (200 mg) by mouth once for 1 dose  Dispense: 2 tablet; Refill: 0    2. Need for prophylactic vaccination and inoculation against influenza  - INFLUENZA VACCINE IM > 6 MONTHS VALENT IIV4 [28473]    3. Lipid screening  - Lipid panel reflex to direct LDL Fasting    4. Screening for diabetes mellitus  - Basic metabolic panel  (Ca, Cl, CO2, Creat, Gluc, K, Na, BUN)     5. Lymphoproliferative disease (H): Chart reviewed only.  Patient was seen for illness in October 2018 including fever and low back pain with right leg sciatica.  Blood work showed lymphocytosis and enlarged lymph nodes on the abdomen abdominal CT scan for this.  Patient had follow-up bone marrow biopsy which was normal in November 2018.  Followed with oncology who did flow cytometry and serial CT scan showing resolution and therefore likely reactive lymphadenopathy.  He was recommended to return to oncology on an as-needed basis only.      Return in about 1 week (around 11/16/2020), or if symptoms worsen or fail to improve.    Parker Cho MD  [facility]     This chart is completed utilizing dictation software; typos and/or incorrect word substitutions may unintentionally occur.

## 2020-11-09 NOTE — PATIENT INSTRUCTIONS
"  Patient Education     Tick Bites  Ticks are small arachnids that feed on the blood of rodents, rabbits, birds, deer, dogs, and people. A tick bite may cause redness, itching, and slight swelling at the site. Sometimes you may have no reaction where the tick bit you.  Ticks transmit disease when microbes in their saliva get into your skin and blood. There are over 800 species of ticks. But only two families of ticks, hard ticks and soft ticks, are known to transmit diseases to humans. Ticks often transmit a disease near the end of a meal. The hard ticks tend to attach and feed for hours to days. It may take hours before a hard tick transmits microbes. Soft ticks often feed for less than 1 hour and can transmit diseases quickly. The bites themselves aren't cause for concern. But ticks can carry and pass on 12 different illnesses. These include Lyme disease and [place] spotted fever.  Symptoms of tick-related diseases vary depending on the disease. The most common symptoms are:    Fever    Chills    Aches and pains such as headache, extreme tiredness (fatigue), and muscle aches    Joint pain (with Lyme disease)    Rash     A \"bull's eye\" rash is a common symptom of Lyme disease.   How to remove a tick  Not all ticks carry disease. A tick attached to you anywhere from minutes to days may infect you, depending on the type of tick and the germs it carries. If you find a tick, don't panic.    Try to carefully remove the tick with tweezers.    Grasp the tick near its head as close to the skin s surface as possible. Pull without twisting and don't crush the body.    After removing the tick, thoroughly clean the bite area and your hands with rubbing alcohol or soap and water.    Put a live tick in alcohol, or in a sealed bag or container, or flush it down the toilet.  When to get medical care  If you can't easily remove the tick or if you leave the head in your skin, get medical care right away.  Tick paralysis is a " rare disease thought to be caused by a toxin in tick saliva. The symptoms include:    Weakness    Paralysis    Confusion    Fever    Numbness    Headaches    Rashes  If you or someone bitten by a tick has these symptoms, get medical care right away. Removing the tick stops the symptoms in about 24 hours.  If you have a rash or fever within a few weeks of removing a tick, see your healthcare provider. Tell the provider about your recent tick bite, when the bite occurred, and where you most likely acquired the tick.    To prevent disease, you may be given antibiotics. Both Lyme disease and [place] spotted fever respond quickly to these medicines.    You may be asked to see your healthcare provider for a blood test. This is to check for Lyme or another tick-related disease.  Follow-up care  Some states and [facility] have services that test ticks for Lyme disease and other diseases. Check with your local officials to see if this service is available in your area.  If you remove a tick yourself, watch for signs of a tick-borne illness. Symptoms may show up in a few days or weeks after a bite. Call your healthcare provider if you notice any of the following:    Rash. This may spread outward in a ring from a hard, white lump. Or it may move up your arms and legs to your chest.    Fever    Chills    Body aches, joint swelling, and pain    Severe headache  [employer] last reviewed this educational content on 8/1/2019 2000-2020 [employer]. [address] [place] [id number]. All rights reserved. This information is not intended as a substitute for professional medical care. Always follow your healthcare professional's instructions.

## 2021-04-04 ENCOUNTER — HEALTH MAINTENANCE LETTER (OUTPATIENT)
Age: 41
End: 2021-04-04

## 2021-09-18 ENCOUNTER — HEALTH MAINTENANCE LETTER (OUTPATIENT)
Age: 41
End: 2021-09-18

## 2022-04-30 ENCOUNTER — HEALTH MAINTENANCE LETTER (OUTPATIENT)
Age: 42
End: 2022-04-30

## 2022-11-20 ENCOUNTER — HEALTH MAINTENANCE LETTER (OUTPATIENT)
Age: 42
End: 2022-11-20

## 2023-06-02 ENCOUNTER — HEALTH MAINTENANCE LETTER (OUTPATIENT)
Age: 43
End: 2023-06-02